# Patient Record
Sex: FEMALE | Race: WHITE | Employment: OTHER | URBAN - METROPOLITAN AREA
[De-identification: names, ages, dates, MRNs, and addresses within clinical notes are randomized per-mention and may not be internally consistent; named-entity substitution may affect disease eponyms.]

---

## 2017-03-23 PROBLEM — I65.23 BILATERAL CAROTID ARTERY STENOSIS: Status: ACTIVE | Noted: 2017-03-23

## 2017-03-23 PROBLEM — R09.89 BRUIT OF RIGHT CAROTID ARTERY: Status: ACTIVE | Noted: 2017-03-23

## 2017-09-14 PROBLEM — Z79.01 CHRONIC ANTICOAGULATION: Chronic | Status: ACTIVE | Noted: 2017-09-14

## 2017-09-14 PROBLEM — Z86.79 HISTORY OF ATRIAL FIBRILLATION: Chronic | Status: ACTIVE | Noted: 2017-09-14

## 2017-09-14 PROBLEM — R33.8 ACUTE URINARY RETENTION: Status: ACTIVE | Noted: 2017-09-14

## 2017-09-14 PROBLEM — I50.32 CHRONIC DIASTOLIC HEART FAILURE (HCC): Chronic | Status: ACTIVE | Noted: 2017-09-14

## 2017-09-14 PROBLEM — R31.0 GROSS HEMATURIA: Chronic | Status: ACTIVE | Noted: 2017-09-14

## 2017-09-20 PROBLEM — M79.89 LEG SWELLING: Status: ACTIVE | Noted: 2017-09-20

## 2018-03-14 RX ORDER — AMIODARONE HYDROCHLORIDE 100 MG/1
100 TABLET ORAL EVERY OTHER DAY
Qty: 45 TABLET | Refills: 3 | Status: SHIPPED
Start: 2018-03-14 | End: 2022-03-10

## 2018-04-11 ENCOUNTER — HOSPITAL ENCOUNTER (OUTPATIENT)
Dept: CARDIOLOGY | Age: 83
Discharge: HOME | End: 2018-04-11
Payer: MEDICARE

## 2018-04-11 ENCOUNTER — OFFICE VISIT (OUTPATIENT)
Dept: VASCULAR SURGERY | Age: 83
End: 2018-04-11
Payer: MEDICARE

## 2018-04-11 ENCOUNTER — OFFICE VISIT (OUTPATIENT)
Dept: CARDIOLOGY CLINIC | Age: 83
End: 2018-04-11
Payer: MEDICARE

## 2018-04-11 VITALS
RESPIRATION RATE: 16 BRPM | DIASTOLIC BLOOD PRESSURE: 72 MMHG | HEIGHT: 60 IN | SYSTOLIC BLOOD PRESSURE: 126 MMHG | BODY MASS INDEX: 34.95 KG/M2 | WEIGHT: 178 LBS | HEART RATE: 63 BPM

## 2018-04-11 DIAGNOSIS — I65.23 BILATERAL CAROTID ARTERY STENOSIS: ICD-10-CM

## 2018-04-11 DIAGNOSIS — R06.09 DYSPNEA ON EXERTION: ICD-10-CM

## 2018-04-11 DIAGNOSIS — I25.10 CORONARY ARTERY DISEASE INVOLVING NATIVE CORONARY ARTERY OF NATIVE HEART WITHOUT ANGINA PECTORIS: Primary | Chronic | ICD-10-CM

## 2018-04-11 DIAGNOSIS — R09.89 BRUIT OF RIGHT CAROTID ARTERY: Primary | ICD-10-CM

## 2018-04-11 PROCEDURE — 93000 ELECTROCARDIOGRAM COMPLETE: CPT | Performed by: INTERNAL MEDICINE

## 2018-04-11 PROCEDURE — 99213 OFFICE O/P EST LOW 20 MIN: CPT | Performed by: SURGERY

## 2018-04-11 PROCEDURE — 1036F TOBACCO NON-USER: CPT | Performed by: SURGERY

## 2018-04-11 PROCEDURE — G8427 DOCREV CUR MEDS BY ELIG CLIN: HCPCS | Performed by: SURGERY

## 2018-04-11 PROCEDURE — G8598 ASA/ANTIPLAT THER USED: HCPCS | Performed by: SURGERY

## 2018-04-11 PROCEDURE — 4040F PNEUMOC VAC/ADMIN/RCVD: CPT | Performed by: SURGERY

## 2018-04-11 PROCEDURE — 93880 EXTRACRANIAL BILAT STUDY: CPT

## 2018-04-11 PROCEDURE — 1123F ACP DISCUSS/DSCN MKR DOCD: CPT | Performed by: SURGERY

## 2018-04-11 PROCEDURE — G8417 CALC BMI ABV UP PARAM F/U: HCPCS | Performed by: SURGERY

## 2018-04-11 PROCEDURE — 1090F PRES/ABSN URINE INCON ASSESS: CPT | Performed by: SURGERY

## 2018-04-11 PROCEDURE — 99214 OFFICE O/P EST MOD 30 MIN: CPT | Performed by: INTERNAL MEDICINE

## 2018-04-11 RX ORDER — APIXABAN 2.5 MG/1
2.5 TABLET, FILM COATED ORAL 2 TIMES DAILY
Refills: 3 | COMMUNITY
Start: 2018-03-14

## 2018-04-12 ENCOUNTER — HOSPITAL ENCOUNTER (OUTPATIENT)
Dept: GENERAL RADIOLOGY | Age: 83
Discharge: HOME | End: 2018-04-14
Payer: MEDICARE

## 2018-04-12 ENCOUNTER — HOSPITAL ENCOUNTER (OUTPATIENT)
Age: 83
Discharge: HOME | End: 2018-04-14
Payer: MEDICARE

## 2018-04-12 DIAGNOSIS — R06.09 DYSPNEA ON EXERTION: ICD-10-CM

## 2018-04-12 PROCEDURE — 71046 X-RAY EXAM CHEST 2 VIEWS: CPT

## 2018-04-13 ENCOUNTER — HOSPITAL ENCOUNTER (OUTPATIENT)
Dept: CARDIOLOGY | Age: 83
Discharge: HOME | End: 2018-04-13
Payer: MEDICARE

## 2018-04-13 VITALS
WEIGHT: 178 LBS | DIASTOLIC BLOOD PRESSURE: 72 MMHG | SYSTOLIC BLOOD PRESSURE: 118 MMHG | HEART RATE: 77 BPM | HEIGHT: 60 IN | BODY MASS INDEX: 34.95 KG/M2

## 2018-04-13 DIAGNOSIS — I25.10 CORONARY ARTERY DISEASE INVOLVING NATIVE CORONARY ARTERY OF NATIVE HEART WITHOUT ANGINA PECTORIS: Chronic | ICD-10-CM

## 2018-04-13 DIAGNOSIS — R07.9 CHEST PAIN, UNSPECIFIED TYPE: ICD-10-CM

## 2018-04-13 DIAGNOSIS — R06.09 DYSPNEA ON EXERTION: ICD-10-CM

## 2018-04-13 PROCEDURE — 6360000002 HC RX W HCPCS: Performed by: INTERNAL MEDICINE

## 2018-04-13 PROCEDURE — A9500 TC99M SESTAMIBI: HCPCS | Performed by: INTERNAL MEDICINE

## 2018-04-13 PROCEDURE — 78452 HT MUSCLE IMAGE SPECT MULT: CPT

## 2018-04-13 PROCEDURE — 93017 CV STRESS TEST TRACING ONLY: CPT

## 2018-04-13 PROCEDURE — 3430000000 HC RX DIAGNOSTIC RADIOPHARMACEUTICAL: Performed by: INTERNAL MEDICINE

## 2018-04-13 PROCEDURE — 2580000003 HC RX 258: Performed by: INTERNAL MEDICINE

## 2018-04-13 RX ORDER — SODIUM CHLORIDE 0.9 % (FLUSH) 0.9 %
10 SYRINGE (ML) INJECTION PRN
Status: DISCONTINUED | OUTPATIENT
Start: 2018-04-13 | End: 2018-04-14 | Stop reason: HOSPADM

## 2018-04-13 RX ADMIN — Medication 10 ML: at 07:47

## 2018-04-13 RX ADMIN — Medication 10 ML: at 07:46

## 2018-04-13 RX ADMIN — Medication 31.9 MILLICURIE: at 07:46

## 2018-04-13 RX ADMIN — Medication 10 ML: at 06:44

## 2018-04-13 RX ADMIN — REGADENOSON 0.4 MG: 0.08 INJECTION, SOLUTION INTRAVENOUS at 07:47

## 2018-04-13 RX ADMIN — Medication 10.6 MILLICURIE: at 06:44

## 2018-08-06 ENCOUNTER — TELEPHONE (OUTPATIENT)
Dept: ADMINISTRATIVE | Age: 83
End: 2018-08-06

## 2018-08-06 NOTE — TELEPHONE ENCOUNTER
Pt calling to cancel her apt today with Dr. Fanny Collier - she does not have a ride. She will callback to r/s when she finds a ride.   She lives in [place], [place].

## 2018-09-25 RX ORDER — METOPROLOL SUCCINATE 25 MG/1
25 TABLET, EXTENDED RELEASE ORAL DAILY
Qty: 90 TABLET | Refills: 3 | Status: SHIPPED | OUTPATIENT
Start: 2018-09-25 | End: 2019-09-17 | Stop reason: SDUPTHER

## 2018-10-11 ENCOUNTER — OFFICE VISIT (OUTPATIENT)
Dept: VASCULAR SURGERY | Age: 83
End: 2018-10-11
Payer: MEDICARE

## 2018-10-11 ENCOUNTER — HOSPITAL ENCOUNTER (OUTPATIENT)
Dept: CARDIOLOGY | Age: 83
Discharge: HOME | End: 2018-10-11
Payer: MEDICARE

## 2018-10-11 DIAGNOSIS — I65.23 BILATERAL CAROTID ARTERY STENOSIS: ICD-10-CM

## 2018-10-11 DIAGNOSIS — R09.89 BRUIT OF RIGHT CAROTID ARTERY: Primary | ICD-10-CM

## 2018-10-11 PROCEDURE — 1123F ACP DISCUSS/DSCN MKR DOCD: CPT | Performed by: SURGERY

## 2018-10-11 PROCEDURE — G8484 FLU IMMUNIZE NO ADMIN: HCPCS | Performed by: SURGERY

## 2018-10-11 PROCEDURE — G8417 CALC BMI ABV UP PARAM F/U: HCPCS | Performed by: SURGERY

## 2018-10-11 PROCEDURE — 1101F PT FALLS ASSESS-DOCD LE1/YR: CPT | Performed by: SURGERY

## 2018-10-11 PROCEDURE — 93880 EXTRACRANIAL BILAT STUDY: CPT

## 2018-10-11 PROCEDURE — G8427 DOCREV CUR MEDS BY ELIG CLIN: HCPCS | Performed by: SURGERY

## 2018-10-11 PROCEDURE — 4040F PNEUMOC VAC/ADMIN/RCVD: CPT | Performed by: SURGERY

## 2018-10-11 PROCEDURE — 1090F PRES/ABSN URINE INCON ASSESS: CPT | Performed by: SURGERY

## 2018-10-11 PROCEDURE — G8598 ASA/ANTIPLAT THER USED: HCPCS | Performed by: SURGERY

## 2018-10-11 PROCEDURE — 1036F TOBACCO NON-USER: CPT | Performed by: SURGERY

## 2018-10-11 PROCEDURE — 99213 OFFICE O/P EST LOW 20 MIN: CPT | Performed by: SURGERY

## 2018-10-11 RX ORDER — GABAPENTIN 100 MG/1
100 CAPSULE ORAL DAILY
Refills: 5 | COMMUNITY
Start: 2018-07-27 | End: 2022-03-10

## 2018-10-11 NOTE — PROGRESS NOTES
Chief Complaint:   Chief Complaint   Patient presents with    Circulatory Problem     Follow up carotid stenosis         HPI:  Patient came to the office for evaluation of carotid artery disease, overall doing well, sometimes gets short of breath, when she is sleeping on the left side, following up with her cardiologist      Patient denies any focal lateralizing neurological symptoms like loss of speech, vision or loss of function of extremity    Patient can walk a few blocks slowly, and denies any symptoms of rest pain    No Known Allergies    Current Outpatient Prescriptions   Medication Sig Dispense Refill    metoprolol succinate (TOPROL XL) 25 MG extended release tablet Take 1 tablet by mouth daily 90 tablet 3    ELIQUIS 2.5 MG TABS tablet 2.5 mg 2 times daily   3    amiodarone (PACERONE) 100 MG tablet Take 1 tablet by mouth every other day 45 tablet 3    levothyroxine (SYNTHROID) 125 MCG tablet 125 mcg daily       rosuvastatin (CRESTOR) 10 MG tablet 10 mg       furosemide (LASIX) 20 MG tablet Take 1 tablet by mouth daily. 30 tablet 3    lisinopril (PRINIVIL;ZESTRIL) 40 MG tablet Take 40 mg by mouth daily.  gabapentin (NEURONTIN) 100 MG capsule Take 100 mg by mouth daily. .  5     No current facility-administered medications for this visit. Past Medical History:   Diagnosis Date    Bilateral carotid artery stenosis 3/23/2017    Bruit of right carotid artery 3/23/2017    CAD (coronary artery disease) 1999    STENTS X2, FOLLOWS WITH DR. Michael Trujillo, Last visit 3/2014, will see in September 2014     Diabetes mellitus ([facility] [address] 1/30/14    BORDERLINE, NO RX CURRENTLY, DIET CONTROLLED Stable checks every other day  running between  6/2014    H/O cardiac catheterization 8/2013    [facility], PATIENT STATES THAT STENTS ARE 50% BLOCKED AND IS CURRENTLY UNDER OBSERVATION WITH DR. Ashley García, states they are stable as of 3/14    Hyperlipidemia     stable 6/2014 per patient     Hypertension     PATIENT STATES BP HAS BEEN STABLE 'S  6/2014    Hypothyroidism     stable 6/2014     Leg swelling 9/20/2017       Past Surgical History:   Procedure Laterality Date    ABDOMEN SURGERY  patient states she was in her 19's     abdominal fissure repair     APPENDECTOMY      16years old    2021 [facility]    CABG X 2, DR. BAER AND [address]    COLONOSCOPY  2014    CORONARY ANGIOPLASTY  1999    DR. LOPEZ., STENTS X 2    DIAGNOSTIC CARDIAC CATH LAB PROCEDURE  2013    SIGNIFICANT LEFT MAIN DISEASE: STATUS POST AORTOCORONARY BYPASS GRAFTING WITH LEFT INTERNAL MAMMARY ARTERY GRAFT TO LEFT ANTERIOR DESCENDING, AS WELL AS SAPHENOUR VEIN GRAFTS TO OBTUSE MARGINAL.  ENDOSCOPY, COLON, DIAGNOSTIC  6/16/2014    HYSTERECTOMY  patient states she was in her 42's     JOINT REPLACEMENT Bilateral     knees -1999    OTHER SURGICAL HISTORY      lysis of adhesions, patient states she was in her 19's    SHOULDER SURGERY Left 2007    rotator cuff    SHOULDER SURGERY Right 2004    replacement     THYROIDECTOMY  11/15/2012    [place] Dr. Luci Lynn        Family History   Problem Relation Age of Onset    Cancer Mother         colon    Heart Disease Sister     Cancer Brother         liver    Heart Disease Brother        Social History     Social History    Marital status:      Spouse name: N/A    Number of children: N/A    Years of education: N/A     Occupational History    Not on file.      Social History Main Topics    Smoking status: Never Smoker    Smokeless tobacco: Never Used    Alcohol use No    Drug use: No    Sexual activity: Not on file     Other Topics Concern    Not on file     Social History Narrative    No narrative on file       Review of Systems:    Eyes:  No blurring, diplopia or vision loss  Respiratory:  No cough, pleuritic chest pain, dyspnea, or wheezing  Cardiovascular: No angina, palpitations   Musculoskeletal:  No arthritis or

## 2018-10-12 NOTE — PROCEDURES
[address]                 [address] [facility],  [facility]                               VASCULAR REPORT    PATIENT NAME: Elza Boss                   :         1931  MED REC NO:   [id number]                            ROOM:  ACCOUNT NO:   [de-identified]                           ADMIT DATE:  10/11/2018  PROVIDER:     Page Swain MD    DATE OF PROCEDURE:  10/11/2018    INDICATION:  Bilateral carotid stenosis. Duplex scanning of the right carotid artery revealed moderate plaque  with a peak internal carotid velocity of 999, diastolic velocity of 60  cm/sec with plaque causing 70% stenosis stenosis. Duplex scanning of the left carotid artery revealed hard plaque with a  peak internal carotid velocity of 830, diastolic velocity of 30 cm/sec  with plaque causing 50% stenosis. IMPRESSION:  70% stenosis of right carotid associated with 50%  stenosis of left carotid artery, unchanged from last study.         Ruy Cancino MD    D: 10/11/2018 11:59:04       T: 10/11/2018 13:56:19     [email or web address]  Job#: [id number]     Doc#: [id number]    CC:

## 2018-10-15 ENCOUNTER — OFFICE VISIT (OUTPATIENT)
Dept: CARDIOLOGY CLINIC | Age: 83
End: 2018-10-15
Payer: MEDICARE

## 2018-10-15 VITALS
BODY MASS INDEX: 35.14 KG/M2 | HEIGHT: 60 IN | WEIGHT: 179 LBS | SYSTOLIC BLOOD PRESSURE: 138 MMHG | HEART RATE: 78 BPM | RESPIRATION RATE: 14 BRPM | DIASTOLIC BLOOD PRESSURE: 76 MMHG

## 2018-10-15 DIAGNOSIS — R06.09 DYSPNEA ON EXERTION: ICD-10-CM

## 2018-10-15 DIAGNOSIS — I25.10 CORONARY ARTERY DISEASE INVOLVING NATIVE CORONARY ARTERY OF NATIVE HEART WITHOUT ANGINA PECTORIS: Primary | Chronic | ICD-10-CM

## 2018-10-15 DIAGNOSIS — I35.0 NONRHEUMATIC AORTIC VALVE STENOSIS: ICD-10-CM

## 2018-10-15 PROCEDURE — 1101F PT FALLS ASSESS-DOCD LE1/YR: CPT | Performed by: INTERNAL MEDICINE

## 2018-10-15 PROCEDURE — 93000 ELECTROCARDIOGRAM COMPLETE: CPT | Performed by: INTERNAL MEDICINE

## 2018-10-15 PROCEDURE — G8417 CALC BMI ABV UP PARAM F/U: HCPCS | Performed by: INTERNAL MEDICINE

## 2018-10-15 PROCEDURE — 1123F ACP DISCUSS/DSCN MKR DOCD: CPT | Performed by: INTERNAL MEDICINE

## 2018-10-15 PROCEDURE — 4040F PNEUMOC VAC/ADMIN/RCVD: CPT | Performed by: INTERNAL MEDICINE

## 2018-10-15 PROCEDURE — G8427 DOCREV CUR MEDS BY ELIG CLIN: HCPCS | Performed by: INTERNAL MEDICINE

## 2018-10-15 PROCEDURE — 99214 OFFICE O/P EST MOD 30 MIN: CPT | Performed by: INTERNAL MEDICINE

## 2018-10-15 PROCEDURE — G8484 FLU IMMUNIZE NO ADMIN: HCPCS | Performed by: INTERNAL MEDICINE

## 2018-10-15 PROCEDURE — 1036F TOBACCO NON-USER: CPT | Performed by: INTERNAL MEDICINE

## 2018-10-15 PROCEDURE — 1090F PRES/ABSN URINE INCON ASSESS: CPT | Performed by: INTERNAL MEDICINE

## 2018-10-15 PROCEDURE — G8598 ASA/ANTIPLAT THER USED: HCPCS | Performed by: INTERNAL MEDICINE

## 2018-10-15 RX ORDER — FUROSEMIDE 40 MG/1
40 TABLET ORAL DAILY
Qty: 30 TABLET | Refills: 3 | Status: SHIPPED | OUTPATIENT
Start: 2018-10-15 | End: 2019-01-11 | Stop reason: SDUPTHER

## 2018-10-15 NOTE — PATIENT INSTRUCTIONS
1. Increase Lasix to 40 mg po daily. 2. Get BMP in one week  3. Schedule for an echo to evaluate aortic stenosis and exertional dyspnea. 4. Continue rest of current medications  5. Follow up with me in one month.

## 2018-10-15 NOTE — PROGRESS NOTES
 CAD (coronary artery disease) 1999    STENTS X2, FOLLOWS WITH DR. Rajeev Marti, Last visit 3/2014, will see in September 2014     Diabetes mellitus ([address] 1/30/14    BORDERLINE, NO RX CURRENTLY, DIET CONTROLLED Stable checks every other day  running between  6/2014    H/O cardiac catheterization 8/2013    [facility], PATIENT STATES THAT STENTS ARE 50% BLOCKED AND IS CURRENTLY UNDER OBSERVATION WITH DR. Peri Parish, states they are stable as of 3/14    Hyperlipidemia     stable 6/2014 per patient     Hypertension     PATIENT STATES BP HAS BEEN STABLE 'S  6/2014    Hypothyroidism     stable 6/2014     Leg swelling 9/20/2017       Past Surgical History:  Past Surgical History:   Procedure Laterality Date    ABDOMEN SURGERY  patient states she was in her 19's     abdominal fissure repair     APPENDECTOMY      16years old    2021 [facility]    CABG X 2, DR. BAER AND [address]    COLONOSCOPY  2014    CORONARY ANGIOPLASTY  1999    DR. LOPEZ., STENTS X 2    DIAGNOSTIC CARDIAC CATH LAB PROCEDURE  2013    SIGNIFICANT LEFT MAIN DISEASE: STATUS POST AORTOCORONARY BYPASS GRAFTING WITH LEFT INTERNAL MAMMARY ARTERY GRAFT TO LEFT ANTERIOR DESCENDING, AS WELL AS SAPHENOUR VEIN GRAFTS TO OBTUSE MARGINAL.  ENDOSCOPY, COLON, DIAGNOSTIC  6/16/2014    HYSTERECTOMY  patient states she was in her 42's     JOINT REPLACEMENT Bilateral     knees -1999    OTHER SURGICAL HISTORY      lysis of adhesions, patient states she was in her 19's    SHOULDER SURGERY Left 2007    rotator cuff    SHOULDER SURGERY Right 2004    replacement     THYROIDECTOMY  11/15/2012    [place] Dr. Gui Gutiérrez        Family History:  Family History   Problem Relation Age of Onset    Cancer Mother         colon    Heart Disease Sister     Cancer Brother         liver    Heart Disease Brother        Social History:  Social History     Social History    Marital status:       Spouse name: N/A extremity edema  Neurologic: No focal motor deficits apparent, normal mood and affect, alert and oriented x 3  Peripheral Pulses: Intact posterior tibial pulses bilaterally    Laboratory Tests:  Lab Results   Component Value Date    CREATININE 0.9 01/09/2018    BUN 24 (H) 01/09/2018     01/09/2018    K 4.4 01/09/2018     01/09/2018    CO2 28 01/09/2018     No results found for: MG  Lab Results   Component Value Date    WBC 7.7 01/09/2018    HGB 13.3 01/09/2018    HCT 41.3 01/09/2018    MCV 90.6 01/09/2018     01/09/2018     Lab Results   Component Value Date    ALT 11 01/09/2018    AST 16 01/09/2018    ALKPHOS 66 01/09/2018    BILITOT 0.8 01/09/2018     Lab Results   Component Value Date    CKTOTAL 47 08/20/2014    CKMB 2.2 08/20/2014    TROPONINI <0.01 08/20/2014    TROPONINI <0.01 08/19/2014    TROPONINI <0.01 08/19/2014     Lab Results   Component Value Date    INR 1.5 09/13/2017    INR 1.0 08/19/2014    PROTIME 16.8 (H) 09/13/2017    PROTIME 10.6 08/19/2014     Lab Results   Component Value Date    TSH 1.090 01/09/2018     Lab Results   Component Value Date    LABA1C 6.6 (H) 08/16/2017     No results found for: EAG  Lab Results   Component Value Date    CHOL 183 08/16/2017    CHOL 180 05/03/2017    CHOL 159 02/15/2017     Lab Results   Component Value Date    TRIG 86 08/16/2017    TRIG 65 05/03/2017    TRIG 104 02/15/2017     Lab Results   Component Value Date    HDL 72 08/16/2017    HDL 95 05/03/2017    HDL 71 02/15/2017     Lab Results   Component Value Date    LDLCALC 94 08/16/2017    LDLCALC 72 05/03/2017    LDLCALC 67 02/15/2017     Lab Results   Component Value Date    LABVLDL 17 08/16/2017    LABVLDL 13 05/03/2017    LABVLDL 21 02/15/2017     No results found for: CHOLHDLRATIO    Cardiac Tests:  ECG: Normal sinus rhythm, with occasional premature atrial complexes and underwent her complexes, low voltage precordial leads, abnormal ECG.     Echocardiogram: 7/12/2017-LVEF 65%, moderate MR,

## 2018-10-15 NOTE — PROGRESS NOTES
calculate for the following reasons: The 2013 ASCVD risk score is only valid for ages 36 to 78        ASSESSMENT:  1. Exertional dyspnea and positional dyspnea - last chest x-ray showed no pulmonary etiology. 2. Coronary artery disease status post CABG-1987, status post stent to RCA in 2000  3. Paroxysmal atrial fibrillation in sinus rhythm on amiodarone and Eliquis  4. History of recent hematuria resolved  5. Hypertension well controlled  6.  hyperlipidemia on statin  7. Aortic stenosis, moderate MR,  moderate to severeTR    Plan:   1. Increase Lasix to 40 mg po daily. 2. Get BMP in one week  3. Schedule for an echo to evaluate aortic stenosis and exertional dyspnea. 4. Continue rest of current medications  5. Follow up with me in one month. The patient's current medication list, allergies, problem list and results of all previously ordered testing were reviewed at today's visit.   Fadi Domingo MD  [facility]) Cardiology

## 2018-10-22 DIAGNOSIS — R06.09 DYSPNEA ON EXERTION: ICD-10-CM

## 2018-10-22 DIAGNOSIS — I35.0 NONRHEUMATIC AORTIC VALVE STENOSIS: ICD-10-CM

## 2018-10-23 ENCOUNTER — TELEPHONE (OUTPATIENT)
Dept: CARDIOLOGY CLINIC | Age: 83
End: 2018-10-23

## 2018-10-25 ENCOUNTER — HOSPITAL ENCOUNTER (OUTPATIENT)
Dept: CARDIOLOGY | Age: 83
Discharge: HOME | End: 2018-10-25
Payer: MEDICARE

## 2018-10-25 DIAGNOSIS — I35.0 NONRHEUMATIC AORTIC VALVE STENOSIS: Primary | ICD-10-CM

## 2018-10-25 LAB
LV EF: 65 %
LVEF MODALITY: NORMAL

## 2018-10-25 PROCEDURE — 93306 TTE W/DOPPLER COMPLETE: CPT | Performed by: PSYCHIATRY & NEUROLOGY

## 2018-10-26 ENCOUNTER — TELEPHONE (OUTPATIENT)
Dept: CARDIOLOGY CLINIC | Age: 83
End: 2018-10-26

## 2018-10-26 NOTE — TELEPHONE ENCOUNTER
Patient notified of echo results and Dr. Frias's recommendation. Patient will keep F/U scheduled for  11/14/18.

## 2018-11-09 ENCOUNTER — TELEPHONE (OUTPATIENT)
Dept: ADMINISTRATIVE | Age: 83
End: 2018-11-09

## 2018-11-15 ENCOUNTER — HOSPITAL ENCOUNTER (OUTPATIENT)
Age: 83
Discharge: HOME | End: 2018-11-17
Payer: MEDICARE

## 2018-11-15 PROCEDURE — 88112 CYTOPATH CELL ENHANCE TECH: CPT

## 2019-01-14 RX ORDER — FUROSEMIDE 40 MG/1
40 TABLET ORAL DAILY
Qty: 30 TABLET | Refills: 11 | Status: SHIPPED | OUTPATIENT
Start: 2019-01-14 | End: 2019-12-09 | Stop reason: SDUPTHER

## 2019-01-16 ENCOUNTER — TELEPHONE (OUTPATIENT)
Dept: CARDIOLOGY CLINIC | Age: 84
End: 2019-01-16

## 2019-02-13 ENCOUNTER — OFFICE VISIT (OUTPATIENT)
Dept: CARDIOLOGY CLINIC | Age: 84
End: 2019-02-13
Payer: MEDICARE

## 2019-02-13 VITALS
HEART RATE: 72 BPM | SYSTOLIC BLOOD PRESSURE: 156 MMHG | WEIGHT: 161 LBS | BODY MASS INDEX: 31.61 KG/M2 | DIASTOLIC BLOOD PRESSURE: 72 MMHG | HEIGHT: 60 IN | RESPIRATION RATE: 18 BRPM

## 2019-02-13 DIAGNOSIS — R06.09 EXERTIONAL DYSPNEA: ICD-10-CM

## 2019-02-13 DIAGNOSIS — I25.10 CORONARY ARTERY DISEASE INVOLVING NATIVE CORONARY ARTERY OF NATIVE HEART WITHOUT ANGINA PECTORIS: Primary | Chronic | ICD-10-CM

## 2019-02-13 DIAGNOSIS — Z51.81 ENCOUNTER FOR MONITORING AMIODARONE THERAPY: ICD-10-CM

## 2019-02-13 DIAGNOSIS — I35.1 NONRHEUMATIC AORTIC VALVE INSUFFICIENCY: ICD-10-CM

## 2019-02-13 DIAGNOSIS — Z79.899 ENCOUNTER FOR MONITORING AMIODARONE THERAPY: ICD-10-CM

## 2019-02-13 PROCEDURE — 1090F PRES/ABSN URINE INCON ASSESS: CPT | Performed by: INTERNAL MEDICINE

## 2019-02-13 PROCEDURE — G8598 ASA/ANTIPLAT THER USED: HCPCS | Performed by: INTERNAL MEDICINE

## 2019-02-13 PROCEDURE — G8484 FLU IMMUNIZE NO ADMIN: HCPCS | Performed by: INTERNAL MEDICINE

## 2019-02-13 PROCEDURE — G8417 CALC BMI ABV UP PARAM F/U: HCPCS | Performed by: INTERNAL MEDICINE

## 2019-02-13 PROCEDURE — G8427 DOCREV CUR MEDS BY ELIG CLIN: HCPCS | Performed by: INTERNAL MEDICINE

## 2019-02-13 PROCEDURE — 99214 OFFICE O/P EST MOD 30 MIN: CPT | Performed by: INTERNAL MEDICINE

## 2019-02-13 PROCEDURE — 1036F TOBACCO NON-USER: CPT | Performed by: INTERNAL MEDICINE

## 2019-02-13 PROCEDURE — 4040F PNEUMOC VAC/ADMIN/RCVD: CPT | Performed by: INTERNAL MEDICINE

## 2019-02-13 PROCEDURE — 1123F ACP DISCUSS/DSCN MKR DOCD: CPT | Performed by: INTERNAL MEDICINE

## 2019-02-13 PROCEDURE — 1101F PT FALLS ASSESS-DOCD LE1/YR: CPT | Performed by: INTERNAL MEDICINE

## 2019-02-13 PROCEDURE — 93000 ELECTROCARDIOGRAM COMPLETE: CPT | Performed by: INTERNAL MEDICINE

## 2019-02-13 RX ORDER — ASPIRIN 81 MG/1
81 TABLET ORAL DAILY
Qty: 90 TABLET | Refills: 1 | Status: SHIPPED | OUTPATIENT
Start: 2019-02-13 | End: 2019-06-27 | Stop reason: SDUPTHER

## 2019-02-14 ENCOUNTER — TELEPHONE (OUTPATIENT)
Dept: CARDIOLOGY CLINIC | Age: 84
End: 2019-02-14

## 2019-02-14 DIAGNOSIS — R06.09 DOE (DYSPNEA ON EXERTION): Primary | ICD-10-CM

## 2019-02-28 ENCOUNTER — HOSPITAL ENCOUNTER (OUTPATIENT)
Dept: GENERAL RADIOLOGY | Age: 84
Discharge: HOME | End: 2019-03-02
Payer: MEDICARE

## 2019-02-28 ENCOUNTER — HOSPITAL ENCOUNTER (OUTPATIENT)
Age: 84
Discharge: HOME | End: 2019-03-02
Payer: MEDICARE

## 2019-02-28 ENCOUNTER — HOSPITAL ENCOUNTER (OUTPATIENT)
Dept: PULMONOLOGY | Age: 84
Discharge: HOME | End: 2019-02-28
Payer: MEDICARE

## 2019-02-28 DIAGNOSIS — Z79.899 ENCOUNTER FOR MONITORING AMIODARONE THERAPY: ICD-10-CM

## 2019-02-28 DIAGNOSIS — R06.09 DOE (DYSPNEA ON EXERTION): ICD-10-CM

## 2019-02-28 DIAGNOSIS — R06.09 EXERTIONAL DYSPNEA: ICD-10-CM

## 2019-02-28 DIAGNOSIS — Z51.81 ENCOUNTER FOR MONITORING AMIODARONE THERAPY: ICD-10-CM

## 2019-02-28 PROCEDURE — 94060 EVALUATION OF WHEEZING: CPT

## 2019-02-28 PROCEDURE — 94729 DIFFUSING CAPACITY: CPT

## 2019-02-28 PROCEDURE — 71046 X-RAY EXAM CHEST 2 VIEWS: CPT

## 2019-02-28 PROCEDURE — 94726 PLETHYSMOGRAPHY LUNG VOLUMES: CPT

## 2019-03-04 ENCOUNTER — TELEPHONE (OUTPATIENT)
Dept: CARDIOLOGY CLINIC | Age: 84
End: 2019-03-04

## 2019-03-26 ENCOUNTER — TELEPHONE (OUTPATIENT)
Dept: CARDIOLOGY CLINIC | Age: 84
End: 2019-03-26

## 2019-03-28 DIAGNOSIS — I35.1 NONRHEUMATIC AORTIC VALVE INSUFFICIENCY: ICD-10-CM

## 2019-03-28 DIAGNOSIS — R06.09 EXERTIONAL DYSPNEA: ICD-10-CM

## 2019-03-29 ENCOUNTER — TELEPHONE (OUTPATIENT)
Dept: CARDIOLOGY CLINIC | Age: 84
End: 2019-03-29

## 2019-04-11 DIAGNOSIS — I65.23 BILATERAL CAROTID ARTERY STENOSIS: Primary | ICD-10-CM

## 2019-04-18 ENCOUNTER — OFFICE VISIT (OUTPATIENT)
Dept: VASCULAR SURGERY | Age: 84
End: 2019-04-18
Payer: MEDICARE

## 2019-04-18 ENCOUNTER — OFFICE VISIT (OUTPATIENT)
Dept: CARDIOLOGY CLINIC | Age: 84
End: 2019-04-18
Payer: MEDICARE

## 2019-04-18 ENCOUNTER — HOSPITAL ENCOUNTER (OUTPATIENT)
Dept: CARDIOLOGY | Age: 84
Discharge: HOME | End: 2019-04-18
Payer: MEDICARE

## 2019-04-18 VITALS
HEIGHT: 60 IN | DIASTOLIC BLOOD PRESSURE: 60 MMHG | RESPIRATION RATE: 16 BRPM | WEIGHT: 163 LBS | SYSTOLIC BLOOD PRESSURE: 128 MMHG | BODY MASS INDEX: 32 KG/M2 | HEART RATE: 74 BPM

## 2019-04-18 DIAGNOSIS — R09.89 BRUIT OF RIGHT CAROTID ARTERY: Primary | ICD-10-CM

## 2019-04-18 DIAGNOSIS — I65.23 BILATERAL CAROTID ARTERY STENOSIS: ICD-10-CM

## 2019-04-18 DIAGNOSIS — R06.09 DOE (DYSPNEA ON EXERTION): Primary | ICD-10-CM

## 2019-04-18 PROCEDURE — 4040F PNEUMOC VAC/ADMIN/RCVD: CPT | Performed by: SURGERY

## 2019-04-18 PROCEDURE — G8598 ASA/ANTIPLAT THER USED: HCPCS | Performed by: INTERNAL MEDICINE

## 2019-04-18 PROCEDURE — 1123F ACP DISCUSS/DSCN MKR DOCD: CPT | Performed by: SURGERY

## 2019-04-18 PROCEDURE — 4040F PNEUMOC VAC/ADMIN/RCVD: CPT | Performed by: INTERNAL MEDICINE

## 2019-04-18 PROCEDURE — G8417 CALC BMI ABV UP PARAM F/U: HCPCS | Performed by: SURGERY

## 2019-04-18 PROCEDURE — 99213 OFFICE O/P EST LOW 20 MIN: CPT | Performed by: SURGERY

## 2019-04-18 PROCEDURE — 1090F PRES/ABSN URINE INCON ASSESS: CPT | Performed by: INTERNAL MEDICINE

## 2019-04-18 PROCEDURE — G8427 DOCREV CUR MEDS BY ELIG CLIN: HCPCS | Performed by: INTERNAL MEDICINE

## 2019-04-18 PROCEDURE — 1090F PRES/ABSN URINE INCON ASSESS: CPT | Performed by: SURGERY

## 2019-04-18 PROCEDURE — 1123F ACP DISCUSS/DSCN MKR DOCD: CPT | Performed by: INTERNAL MEDICINE

## 2019-04-18 PROCEDURE — 99214 OFFICE O/P EST MOD 30 MIN: CPT | Performed by: INTERNAL MEDICINE

## 2019-04-18 PROCEDURE — G8417 CALC BMI ABV UP PARAM F/U: HCPCS | Performed by: INTERNAL MEDICINE

## 2019-04-18 PROCEDURE — 1036F TOBACCO NON-USER: CPT | Performed by: SURGERY

## 2019-04-18 PROCEDURE — 93000 ELECTROCARDIOGRAM COMPLETE: CPT | Performed by: INTERNAL MEDICINE

## 2019-04-18 PROCEDURE — G8427 DOCREV CUR MEDS BY ELIG CLIN: HCPCS | Performed by: SURGERY

## 2019-04-18 PROCEDURE — G8598 ASA/ANTIPLAT THER USED: HCPCS | Performed by: SURGERY

## 2019-04-18 PROCEDURE — 93880 EXTRACRANIAL BILAT STUDY: CPT

## 2019-04-18 PROCEDURE — 1036F TOBACCO NON-USER: CPT | Performed by: INTERNAL MEDICINE

## 2019-04-18 NOTE — PROGRESS NOTES
OUTPATIENT CARDIOLOGY FOLLOW-UP    Name: Ann Hood    Age: 80 y.o. Primary Care Physician: John Mims MD    Date of Service: 4/18/2019    Chief Complaint:   Chief Complaint   Patient presents with    Coronary Artery Disease       Interim History:   Mrs. Deya Ritter is a 51-year-old female history of coronary artery disease diagnosed in 1987 underwent coronary artery bypass surgery in 1987, had a cardiac cath in 1996 which showed total occlusion of the left main and 70% stenosis of the RCA with patent grafts. Cardiac cath in 1999 showed a 95% stenosis RCA and underwent PTCA and stent to proximal and distal RCA and repeat In 2000 showed patent grafts and stents. She also has history of hypertension hyperlipidemia, history of for atrial fibrillation on amiodarone and Eliquis and is here for follow-up visit. She was last seen in the office on 2/13/2019. Since her last visit, she has not had any ER visits. She has dyspnea on laying down and able to sleep only couple of hours at a time. Has swelling on her legs. She lost 8 lbs since her last visit. She has been experiencing pinching pain over the left side of the chest pain with radiation to to left scapula and lasts only seconds. This pain she noticed mostly while sleeping and not with exertion. She is also experiencing palpitations on walking. She denies recent lightheadedness, dizziness, syncope. She denies any exertional dyspnea but she does gets winded if she walks fast.  She never smoked. She she had a cardiac MRI done last month to evaluate the severity of the aortic regurgitation and the results showed moderate aortic regurgitation with regurgitant fraction of 30%. She is still complaining of dyspnea with exertion without any chest pain. Her transthoracic echocardiogram showed diastolic dysfunction stage II. She was questioning about the anticoagulation therapy with Eliquis. Currently she is taking only 2.5 mg twice daily.  Ideally, she MAMMARY ARTERY GRAFT TO LEFT ANTERIOR DESCENDING, AS WELL AS SAPHENOUR VEIN GRAFTS TO OBTUSE MARGINAL.  ENDOSCOPY, COLON, DIAGNOSTIC  6/16/2014    HYSTERECTOMY  patient states she was in her 42's     JOINT REPLACEMENT Bilateral     knees -1999    OTHER SURGICAL HISTORY      lysis of adhesions, patient states she was in her 19's    SHOULDER SURGERY Left 2007    rotator cuff    SHOULDER SURGERY Right 2004    replacement     THYROIDECTOMY  11/15/2012    [place] [facility]. Tolbert Factor        Family History:  Family History   Problem Relation Age of Onset    Cancer Mother         colon    Heart Disease Sister     Cancer Brother         liver    Heart Disease Brother        Social History:  Social History     Socioeconomic History    Marital status:       Spouse name: Not on file    Number of children: Not on file    Years of education: Not on file    Highest education level: Not on file   Occupational History    Not on file   Social Needs    Financial resource strain: Not on file    Food insecurity:     Worry: Not on file     Inability: Not on file    Transportation needs:     Medical: Not on file     Non-medical: Not on file   Tobacco Use    Smoking status: Never Smoker    Smokeless tobacco: Never Used   Substance and Sexual Activity    Alcohol use: No     Alcohol/week: 0.0 oz    Drug use: No    Sexual activity: Not on file   Lifestyle    Physical activity:     Days per week: Not on file     Minutes per session: Not on file    Stress: Not on file   Relationships    Social connections:     Talks on phone: Not on file     Gets together: Not on file     Attends Shinto service: Not on file     Active member of club or organization: Not on file     Attends meetings of clubs or organizations: Not on file     Relationship status: Not on file    Intimate partner violence:     Fear of current or ex partner: Not on file     Emotionally abused: Not on file     Physically abused: Not on file     Forced deficits apparent, normal mood and affect, alert and oriented x 3  Peripheral Pulses: Intact posterior tibial pulses bilaterally    Laboratory Tests:  Lab Results   Component Value Date    CREATININE 0.9 01/09/2018    BUN 24 (H) 01/09/2018     01/09/2018    K 4.4 01/09/2018     01/09/2018    CO2 28 01/09/2018     No results found for: MG  Lab Results   Component Value Date    WBC 7.7 01/09/2018    HGB 13.3 01/09/2018    HCT 41.3 01/09/2018    MCV 90.6 01/09/2018     01/09/2018     Lab Results   Component Value Date    ALT 11 01/09/2018    AST 16 01/09/2018    ALKPHOS 66 01/09/2018    BILITOT 0.8 01/09/2018     Lab Results   Component Value Date    CKTOTAL 47 08/20/2014    CKMB 2.2 08/20/2014    TROPONINI <0.01 08/20/2014    TROPONINI <0.01 08/19/2014    TROPONINI <0.01 08/19/2014     Lab Results   Component Value Date    INR 1.5 09/13/2017    INR 1.0 08/19/2014    PROTIME 16.8 (H) 09/13/2017    PROTIME 10.6 08/19/2014     Lab Results   Component Value Date    TSH 1.090 01/09/2018     Lab Results   Component Value Date    LABA1C 6.6 (H) 08/16/2017     No results found for: EAG  Lab Results   Component Value Date    CHOL 183 08/16/2017    CHOL 180 05/03/2017    CHOL 159 02/15/2017     Lab Results   Component Value Date    TRIG 86 08/16/2017    TRIG 65 05/03/2017    TRIG 104 02/15/2017     Lab Results   Component Value Date    HDL 72 08/16/2017    HDL 95 05/03/2017    HDL 71 02/15/2017     Lab Results   Component Value Date    LDLCALC 94 08/16/2017    LDLCALC 72 05/03/2017    LDLCALC 67 02/15/2017     Lab Results   Component Value Date    LABVLDL 17 08/16/2017    LABVLDL 13 05/03/2017    LABVLDL 21 02/15/2017     No results found for: CHOLHDLRATIO    Cardiac Tests:  ECG: Normal sinus rhythm, 1st degree AV block other wise normal EKG. Echocardiogram: 7/12/2017-LVEF 65%, moderate MR, mild to moderate ileus, mild AR, moderate to severe TR, normal PASp.     TTE- 10/25/2018  Normal left ventricle size aspirin 81 mg po daily  2. Continue Eliquis 2.5 mg po BID (low dose due to hematuria)  3. Continue rest of current medications  4. There are no contraindications for flying. 5. Follow up with me in 6 months. The patient's current medication list, allergies, problem list and results of all previously ordered testing were reviewed at today's visit.     Lee Meneses MD  [facility]) Cardiology

## 2019-04-18 NOTE — PROGRESS NOTES
Chief Complaint:   Chief Complaint   Patient presents with    Circulatory Problem     Follow up carotid stenosis         HPI:  A she came to the office with her family, for evaluation of carotid artery disease, doing well      Patient denies any focal lateralizing neurological symptoms like loss of speech, vision or loss of function of extremity    Patient can walk a few blocks without difficulty, and denies any symptoms of rest pain    No Known Allergies    Current Outpatient Medications   Medication Sig Dispense Refill    aspirin EC 81 MG EC tablet Take 1 tablet by mouth daily 90 tablet 1    furosemide (LASIX) 40 MG tablet TAKE 1 TABLET BY MOUTH DAILY 30 tablet 11    metFORMIN (GLUCOPHAGE) 500 MG tablet 500 mg daily (with breakfast)   5    gabapentin (NEURONTIN) 100 MG capsule Take 100 mg by mouth daily. .  5    metoprolol succinate (TOPROL XL) 25 MG extended release tablet Take 1 tablet by mouth daily 90 tablet 3    ELIQUIS 2.5 MG TABS tablet 2.5 mg 2 times daily   3    amiodarone (PACERONE) 100 MG tablet Take 1 tablet by mouth every other day (Patient taking differently: Take 100 mg by mouth daily Indications: pt. is taking 1/2tablet daily ) 45 tablet 3    levothyroxine (SYNTHROID) 125 MCG tablet 112 mcg Daily       rosuvastatin (CRESTOR) 10 MG tablet 10 mg daily       lisinopril (PRINIVIL;ZESTRIL) 40 MG tablet Take 40 mg by mouth daily. No current facility-administered medications for this visit. Past Medical History:   Diagnosis Date    Bilateral carotid artery stenosis 3/23/2017    Bruit of right carotid artery 3/23/2017    CAD (coronary artery disease) 1999    STENTS X2, FOLLOWS WITH DR. Merrick Ward, Last visit 3/2014, will see in September 2014     Diabetes mellitus ([facility] [address] 1/30/14    BORDERLINE, NO RX CURRENTLY, DIET CONTROLLED Stable checks every other day  running between  6/2014    H/O cardiac catheterization 8/2013    [facility], PATIENT STATES THAT STENTS ARE 50% BLOCKED AND IS CURRENTLY UNDER OBSERVATION WITH DR. Elizabeth Fernandes, states they are stable as of 3/14    Hyperlipidemia     stable 6/2014 per patient     Hypertension     PATIENT STATES BP HAS BEEN STABLE 'S  6/2014    Hypothyroidism     stable 6/2014     Leg swelling 9/20/2017       Past Surgical History:   Procedure Laterality Date    ABDOMEN SURGERY  patient states she was in her 19's     abdominal fissure repair     APPENDECTOMY      16years old    2021 [facility].    CABG X 2, DR. BAER AND [address]    COLONOSCOPY  2014    CORONARY ANGIOPLASTY  1999    DR. LOPEZ., STENTS X 2    DIAGNOSTIC CARDIAC CATH LAB PROCEDURE  2013    SIGNIFICANT LEFT MAIN DISEASE: STATUS POST AORTOCORONARY BYPASS GRAFTING WITH LEFT INTERNAL MAMMARY ARTERY GRAFT TO LEFT ANTERIOR DESCENDING, AS WELL AS SAPHENOUR VEIN GRAFTS TO OBTUSE MARGINAL.  ENDOSCOPY, COLON, DIAGNOSTIC  6/16/2014    HYSTERECTOMY  patient states she was in her 42's     JOINT REPLACEMENT Bilateral     knees -1999    OTHER SURGICAL HISTORY      lysis of adhesions, patient states she was in her 19's    SHOULDER SURGERY Left 2007    rotator cuff    SHOULDER SURGERY Right 2004    replacement     THYROIDECTOMY  11/15/2012    [place] Dr. Rachel Rosenthal        Family History   Problem Relation Age of Onset    Cancer Mother         colon    Heart Disease Sister     Cancer Brother         liver    Heart Disease Brother        Social History     Socioeconomic History    Marital status:       Spouse name: Not on file    Number of children: Not on file    Years of education: Not on file    Highest education level: Not on file   Occupational History    Not on file   Social Needs    Financial resource strain: Not on file    Food insecurity:     Worry: Not on file     Inability: Not on file    Transportation needs:     Medical: Not on file     Non-medical: Not on file   Tobacco Use    Smoking status: Never Smoker  Smokeless tobacco: Never Used   Substance and Sexual Activity    Alcohol use: No     Alcohol/week: 0.0 oz    Drug use: No    Sexual activity: Not on file   Lifestyle    Physical activity:     Days per week: Not on file     Minutes per session: Not on file    Stress: Not on file   Relationships    Social connections:     Talks on phone: Not on file     Gets together: Not on file     Attends Hindu service: Not on file     Active member of club or organization: Not on file     Attends meetings of clubs or organizations: Not on file     Relationship status: Not on file    Intimate partner violence:     Fear of current or ex partner: Not on file     Emotionally abused: Not on file     Physically abused: Not on file     Forced sexual activity: Not on file   Other Topics Concern    Not on file   Social History Narrative    Not on file       Review of Systems:    Eyes:  No blurring, diplopia or vision loss  Respiratory:  No cough, pleuritic chest pain, dyspnea, or wheezing  Cardiovascular: No angina, palpitations   Musculoskeletal:  No arthritis or weakness  Neurologic:  No paralysis, paresis, paresthesia, seizures or headach        Physical Exam:  General appearance:  Alert, awake, oriented x 3. No distress. Eyes:  Conjunctivae appear normal; PERRL  Neck:  No jugular venous distention, lymphadenopathy or thyromegaly. Carotid bruit noted  Lungs:  Clear to ausculation bilaterally. No rhonchi, crackles, wheezes  Heart:  Regular rate and rhythm. No rub or murmur  Abdomen:  Soft, non-tender. No masses, organomegaly. Musculoskeletal : No joint effusions, tenderness swelling    Neuro: Speech is intact. Moving all extremities. No focal motor or sensory deficits      Extremities:  Both feet are warm to touch.  The color of both feet is normal.        Pulses Right  Left    Brachial 3 3    Radial    3=normal   Femoral 2 2  2=diminished   Popliteal    1=barely palpable   Dorsalis pedis    0=absent   Posterior

## 2019-04-19 ENCOUNTER — APPOINTMENT (OUTPATIENT)
Dept: ULTRASOUND IMAGING | Age: 84
End: 2019-04-19
Payer: MEDICARE

## 2019-04-19 ENCOUNTER — APPOINTMENT (OUTPATIENT)
Dept: GENERAL RADIOLOGY | Age: 84
End: 2019-04-19
Payer: MEDICARE

## 2019-04-19 ENCOUNTER — HOSPITAL ENCOUNTER (EMERGENCY)
Age: 84
Discharge: HOME | End: 2019-04-19
Attending: EMERGENCY MEDICINE
Payer: MEDICARE

## 2019-04-19 ENCOUNTER — HOSPITAL ENCOUNTER (OUTPATIENT)
Dept: MRI IMAGING | Age: 84
Discharge: HOME | End: 2019-04-21
Payer: MEDICARE

## 2019-04-19 ENCOUNTER — APPOINTMENT (OUTPATIENT)
Dept: CT IMAGING | Age: 84
End: 2019-04-19
Payer: MEDICARE

## 2019-04-19 VITALS
RESPIRATION RATE: 16 BRPM | WEIGHT: 161 LBS | OXYGEN SATURATION: 99 % | DIASTOLIC BLOOD PRESSURE: 79 MMHG | HEIGHT: 60 IN | TEMPERATURE: 97.5 F | SYSTOLIC BLOOD PRESSURE: 149 MMHG | HEART RATE: 68 BPM | BODY MASS INDEX: 31.61 KG/M2

## 2019-04-19 DIAGNOSIS — R42 DIZZINESS: Primary | ICD-10-CM

## 2019-04-19 DIAGNOSIS — R42 DIZZINESS: ICD-10-CM

## 2019-04-19 LAB
ANION GAP SERPL CALCULATED.3IONS-SCNC: 11 MMOL/L (ref 7–16)
APTT: 32.9 SEC (ref 24.5–35.1)
BUN BLDV-MCNC: 17 MG/DL (ref 8–23)
CALCIUM SERPL-MCNC: 9 MG/DL (ref 8.6–10.2)
CHLORIDE BLD-SCNC: 100 MMOL/L (ref 98–107)
CHP ED QC CHECK: YES
CO2: 30 MMOL/L (ref 22–29)
CREAT SERPL-MCNC: 0.9 MG/DL (ref 0.5–1)
GFR AFRICAN AMERICAN: >60
GFR NON-AFRICAN AMERICAN: 59 ML/MIN/1.73
GLUCOSE BLD-MCNC: 114 MG/DL
GLUCOSE BLD-MCNC: 132 MG/DL (ref 74–99)
HCT VFR BLD CALC: 34.3 % (ref 34–48)
HEMOGLOBIN: 11.5 G/DL (ref 11.5–15.5)
INR BLD: 1.4
MAGNESIUM: 2 MG/DL (ref 1.6–2.6)
MCH RBC QN AUTO: 30.4 PG (ref 26–35)
MCHC RBC AUTO-ENTMCNC: 33.5 % (ref 32–34.5)
MCV RBC AUTO: 90.7 FL (ref 80–99.9)
METER GLUCOSE: 114 MG/DL (ref 74–99)
PDW BLD-RTO: 13.4 FL (ref 11.5–15)
PLATELET # BLD: 211 E9/L (ref 130–450)
PMV BLD AUTO: 9.9 FL (ref 7–12)
POTASSIUM SERPL-SCNC: 3.3 MMOL/L (ref 3.5–5)
PRO-BNP: 879 PG/ML (ref 0–450)
PROTHROMBIN TIME: 15.2 SEC (ref 9.3–12.4)
RBC # BLD: 3.78 E12/L (ref 3.5–5.5)
SODIUM BLD-SCNC: 141 MMOL/L (ref 132–146)
TROPONIN: <0.01 NG/ML (ref 0–0.03)
WBC # BLD: 7.4 E9/L (ref 4.5–11.5)

## 2019-04-19 PROCEDURE — 85730 THROMBOPLASTIN TIME PARTIAL: CPT

## 2019-04-19 PROCEDURE — 6370000000 HC RX 637 (ALT 250 FOR IP): Performed by: EMERGENCY MEDICINE

## 2019-04-19 PROCEDURE — 96374 THER/PROPH/DIAG INJ IV PUSH: CPT

## 2019-04-19 PROCEDURE — 6360000004 HC RX CONTRAST MEDICATION: Performed by: PHYSICIAN ASSISTANT

## 2019-04-19 PROCEDURE — 84484 ASSAY OF TROPONIN QUANT: CPT

## 2019-04-19 PROCEDURE — 85027 COMPLETE CBC AUTOMATED: CPT

## 2019-04-19 PROCEDURE — 93005 ELECTROCARDIOGRAM TRACING: CPT

## 2019-04-19 PROCEDURE — 99285 EMERGENCY DEPT VISIT HI MDM: CPT

## 2019-04-19 PROCEDURE — 80048 BASIC METABOLIC PNL TOTAL CA: CPT

## 2019-04-19 PROCEDURE — 83880 ASSAY OF NATRIURETIC PEPTIDE: CPT

## 2019-04-19 PROCEDURE — 82962 GLUCOSE BLOOD TEST: CPT

## 2019-04-19 PROCEDURE — 6360000002 HC RX W HCPCS: Performed by: EMERGENCY MEDICINE

## 2019-04-19 PROCEDURE — 6360000004 HC RX CONTRAST MEDICATION: Performed by: RADIOLOGY

## 2019-04-19 PROCEDURE — 71045 X-RAY EXAM CHEST 1 VIEW: CPT

## 2019-04-19 PROCEDURE — 83735 ASSAY OF MAGNESIUM: CPT

## 2019-04-19 PROCEDURE — 93971 EXTREMITY STUDY: CPT

## 2019-04-19 PROCEDURE — A9579 GAD-BASE MR CONTRAST NOS,1ML: HCPCS | Performed by: PHYSICIAN ASSISTANT

## 2019-04-19 PROCEDURE — 85610 PROTHROMBIN TIME: CPT

## 2019-04-19 PROCEDURE — 70552 MRI BRAIN STEM W/DYE: CPT

## 2019-04-19 PROCEDURE — 2580000003 HC RX 258: Performed by: EMERGENCY MEDICINE

## 2019-04-19 PROCEDURE — 71275 CT ANGIOGRAPHY CHEST: CPT

## 2019-04-19 RX ORDER — POTASSIUM CHLORIDE 1.5 G/1.77G
40 POWDER, FOR SOLUTION ORAL ONCE
Status: DISCONTINUED | OUTPATIENT
Start: 2019-04-19 | End: 2019-04-19 | Stop reason: CLARIF

## 2019-04-19 RX ORDER — MECLIZINE HCL 12.5 MG/1
25 TABLET ORAL ONCE
Status: COMPLETED | OUTPATIENT
Start: 2019-04-19 | End: 2019-04-19

## 2019-04-19 RX ORDER — MECLIZINE HYDROCHLORIDE 25 MG/1
25 TABLET ORAL 3 TIMES DAILY PRN
Qty: 21 TABLET | Refills: 0 | Status: SHIPPED | OUTPATIENT
Start: 2019-04-19 | End: 2019-04-26

## 2019-04-19 RX ORDER — FUROSEMIDE 10 MG/ML
20 INJECTION INTRAMUSCULAR; INTRAVENOUS ONCE
Status: COMPLETED | OUTPATIENT
Start: 2019-04-19 | End: 2019-04-19

## 2019-04-19 RX ORDER — SODIUM CHLORIDE 9 MG/ML
1000 INJECTION, SOLUTION INTRAVENOUS CONTINUOUS
Status: DISCONTINUED | OUTPATIENT
Start: 2019-04-19 | End: 2019-04-19 | Stop reason: HOSPADM

## 2019-04-19 RX ADMIN — SODIUM CHLORIDE 1000 ML: 9 INJECTION, SOLUTION INTRAVENOUS at 12:58

## 2019-04-19 RX ADMIN — MECLIZINE 25 MG: 12.5 TABLET ORAL at 16:05

## 2019-04-19 RX ADMIN — IOPAMIDOL 80 ML: 755 INJECTION, SOLUTION INTRAVENOUS at 14:03

## 2019-04-19 RX ADMIN — FUROSEMIDE 20 MG: 10 INJECTION, SOLUTION INTRAVENOUS at 14:50

## 2019-04-19 RX ADMIN — POTASSIUM BICARBONATE 40 MEQ: 782 TABLET, EFFERVESCENT ORAL at 14:50

## 2019-04-19 RX ADMIN — GADOTERIDOL 10 ML: 279.3 INJECTION, SOLUTION INTRAVENOUS at 10:21

## 2019-04-19 NOTE — ED PROVIDER NOTES
HPI:  4/19/19,   Time: 2:20 PM         Vijay Tolentino is a 80 y.o. female presenting to the ED for Dizziness and tachycardia, beginning several minutes after getting a MRI of the brain. The complaint has been persistent, mild in severity, and worsened by changing position, movement of the head, standing. Patient stated that after the MRI she was having trouble standing and walking. She was unable to drive her car back home. She cannot leave the parking lot she came into the emergency department for evaluation. Patient also complains of shortness of breath stated that her left leg is swollen. She does have history of A. fib currently on [employer]. ROS:   Pertinent positives and negatives are stated within HPI, all other systems reviewed and are negative.  --------------------------------------------- PAST HISTORY ---------------------------------------------  Past Medical History:  has a past medical history of Bilateral carotid artery stenosis, Bruit of right carotid artery, CAD (coronary artery disease), Diabetes mellitus ([address] H/O cardiac catheterization, Hyperlipidemia, Hypertension, Hypothyroidism, and Leg swelling. Past Surgical History:  has a past surgical history that includes Thyroidectomy (11/15/2012); Diagnostic Cardiac Cath Lab Procedure (2013); Colonoscopy (2014); Coronary angioplasty (1999); Cardiac surgery (1987); Appendectomy; other surgical history; Hysterectomy (patient states she was in her 42's ); Abdomen surgery (patient states she was in her 19's ); Cholecystectomy (1989); joint replacement (Bilateral); shoulder surgery (Left, 2007); shoulder surgery (Right, 2004); and Endoscopy, colon, diagnostic (6/16/2014). Social History:  reports that she has never smoked. She has never used smokeless tobacco. She reports that she does not drink alcohol or use drugs. Family History: family history includes Cancer in her brother and mother; Heart Disease in her brother and sister.      The patients home medications have been reviewed. Allergies: Patient has no known allergies. -------------------------------------------------- RESULTS -------------------------------------------------  All laboratory and radiology results have been personally reviewed by myself   LABS:  Results for orders placed or performed during the hospital encounter of 04/19/19   CBC   Result Value Ref Range    WBC 7.4 4.5 - 11.5 E9/L    RBC 3.78 3.50 - 5.50 E12/L    Hemoglobin 11.5 11.5 - 15.5 g/dL    Hematocrit 34.3 34.0 - 48.0 %    MCV 90.7 80.0 - 99.9 fL    MCH 30.4 26.0 - 35.0 pg    MCHC 33.5 32.0 - 34.5 %    RDW 13.4 11.5 - 15.0 fL    Platelets 098 302 - 249 E9/L    MPV 9.9 7.0 - 12.0 fL   Basic Metabolic Panel   Result Value Ref Range    Sodium 141 132 - 146 mmol/L    Potassium 3.3 (L) 3.5 - 5.0 mmol/L    Chloride 100 98 - 107 mmol/L    CO2 30 (H) 22 - 29 mmol/L    Anion Gap 11 7 - 16 mmol/L    Glucose 132 (H) 74 - 99 mg/dL    BUN 17 8 - 23 mg/dL    CREATININE 0.9 0.5 - 1.0 mg/dL    GFR Non-African American 59 >=60 mL/min/1.73    GFR African American >60     Calcium 9.0 8.6 - 10.2 mg/dL   Magnesium   Result Value Ref Range    Magnesium 2.0 1.6 - 2.6 mg/dL   Troponin   Result Value Ref Range    Troponin <0.01 0.00 - 0.03 ng/mL   Brain Natriuretic Peptide   Result Value Ref Range    Pro- (H) 0 - 450 pg/mL   Protime-INR   Result Value Ref Range    Protime 15.2 (H) 9.3 - 12.4 sec    INR 1.4    APTT   Result Value Ref Range    aPTT 32.9 24.5 - 35.1 sec   POCT Glucose   Result Value Ref Range    Glucose 114 mg/dL    QC OK? yes    POCT Glucose   Result Value Ref Range    Meter Glucose 114 (H) 74 - 99 mg/dL       RADIOLOGY:  Interpreted by Radiologist.  XR CHEST PORTABLE   Final Result   1. Stable, enlarged cardiomediastinal silhouette with mild central   pulmonary vascular congestion and thoracic aortic vascular   calcifications. 2. Bibasilar atelectasis. 3. Severe left glenohumeral osteoarthritis.       CTA PULMONARY W CONTRAST   Final Result   1. No CT evidence of pulmonary embolism to the segmental level. More   distal pulmonary emboli are not excluded. 2. Moderate cardiomegaly with calcified and noncalcified plaque within   the thoracic and abdominal aorta and other major arterial flow/upper   abdomen. Additional area of vascular calcification/disease noted in   the left main, left anterior descending, left circumflex and right   coronary arteries. US DUP LOWER EXTREMITY LEFT LEMUEL   Final Result   No evidence for deep venous thrombosis within the left lower   extremity.                   ------------------------- NURSING NOTES AND VITALS REVIEWED ---------------------------   The nursing notes within the ED encounter and vital signs as below have been reviewed. BP (!) 149/79   Pulse 68   Temp 97.5 °F (36.4 °C) (Oral)   Resp 16   Ht 5' (1.524 m)   Wt 161 lb (73 kg)   SpO2 99%   BMI 31.44 kg/m²   Oxygen Saturation Interpretation: Normal      ---------------------------------------------------PHYSICAL EXAM--------------------------------------      Constitutional/General: Alert and oriented x3, well appearing, non toxic in NAD  Head: NC/AT  Eyes: PERRL, EOMI  Mouth: Oropharynx clear, handling secretions, no trismus  Neck: Supple, full ROM, no meningeal signs  Pulmonary: Lungs clear to auscultation bilaterally, no wheezes, rales, or rhonchi. Not in respiratory distress  Cardiovascular:  Regular rate and rhythm, no murmurs, gallops, or rubs. 2+ distal pulses  Abdomen: Soft, non tender, non distended,   Extremities: Moves all extremities x 4.  Warm and well perfused  Skin: warm and dry without rash  Neurologic: GCS 15,  Psych: Normal Affect      ------------------------------ ED COURSE/MEDICAL DECISION MAKING----------------------  Medications   0.9 % sodium chloride infusion (1,000 mLs Intravenous New Bag 4/19/19 1258)   iopamidol (ISOVUE-370) 76 % injection 80 mL (80 mLs Intravenous Given 4/19/19 1403)   furosemide (LASIX) injection 20 mg (20 mg Intravenous Given 19 1450)   potassium bicarb-citric acid (EFFER-K) effervescent tablet 40 mEq (40 mEq Oral Given 19 1450)   meclizine (ANTIVERT) tablet 25 mg (25 mg Oral Given 19 1605)         Medical Decision Makin yo female complains of dizziness after having MRI. Patient states it's worse when she moves around and sits up. She was given IV fluids and potassium replacement as well as Antivert. On reevaluation patient states she's feeling much better. She'll be discharged home. Orthostatics were negative. All diagnostic reviewed and not worrisome. CTA of the chest was negative. Counseling: The emergency provider has spoken with the patient and discussed todays results, in addition to providing specific details for the plan of care and counseling regarding the diagnosis and prognosis. Questions are answered at this time and they are agreeable with the plan.      --------------------------------- IMPRESSION AND DISPOSITION ---------------------------------    IMPRESSION  1.  Dizziness Stable       DISPOSITION  Disposition: Discharge to home  Patient condition is stable                Chayito Escalera DO  [phone]

## 2019-04-22 NOTE — PROCEDURES
[address]                  [address] [facility]                                VASCULAR REPORT    PATIENT NAME: Hany Packer                     :        1931  MED REC NO:   [id number]                            ROOM:  ACCOUNT NO:   [de-identified]                           ADMIT DATE: 2019  PROVIDER:     Glenna Bartlett MD    DATE OF PROCEDURE:  2019    CAROTID ULTRASOUND REPORT    INDICATION:  Bilateral carotid artery stenosis. FINDINGS:  Duplex scan of the right carotid artery revealed hard plaque  with a peak internal carotid velocity of 647, diastolic velocity of 62  cm/sec with the plaque causing 70% stenosis. Duplex scan of the left carotid artery revealed hard plaque with a peak  internal carotid velocity of 344, diastolic velocity of 33 cm/sec with  the plaque causing 50% stenosis. IMPRESSION:  Right carotid artery stenosis of 70% associated with left  carotid artery stenosis of 50%, unchanged from last year.         Loli Mora MD    D: 2019 14:05:21       T: 2019 23:48:18     [id number]  Job#: [id number]     Doc#: [id number]    CC:

## 2019-04-24 LAB
EKG ATRIAL RATE: 71 BPM
EKG P AXIS: 52 DEGREES
EKG P-R INTERVAL: 268 MS
EKG Q-T INTERVAL: 458 MS
EKG QRS DURATION: 86 MS
EKG QTC CALCULATION (BAZETT): 497 MS
EKG T AXIS: 27 DEGREES
EKG VENTRICULAR RATE: 71 BPM

## 2019-06-07 ENCOUNTER — TELEPHONE (OUTPATIENT)
Dept: ADMINISTRATIVE | Age: 84
End: 2019-06-07

## 2019-06-07 NOTE — TELEPHONE ENCOUNTER
Patient notified of Dr. Mead Parent recommendation. Patient wishes to wait and see Dr. Viridiana Falcon. F/U scheduled for 8/20/19 ta 9:20 a.m.

## 2019-06-07 NOTE — TELEPHONE ENCOUNTER
Spoke to patient. She was last seen in the office in April 2019. She does have complaints of shortness of breath when lying down. She states she does not have any shortness of breath when sitting. She does have palpitations every now and then and rare episodes of chest pain but she states it more of a pain on the top of her left breast.  In the process of scheduling for shoulder surgery and needs cardiac clearance. Please advise.

## 2019-06-07 NOTE — TELEPHONE ENCOUNTER
Dr. Wade Mathew is planning shoulder replacement surgery but is asking for clearance. Surgery not yet scheduled. Pt would like an appt to discuss other problems. Please contact pt.

## 2019-06-27 RX ORDER — ASPIRIN 81 MG/1
TABLET ORAL
Qty: 90 TABLET | Refills: 3 | Status: SHIPPED | OUTPATIENT
Start: 2019-06-27

## 2019-09-03 LAB
ALBUMIN SERPL-MCNC: NORMAL G/DL
ALP BLD-CCNC: NORMAL U/L
ALT SERPL-CCNC: NORMAL U/L
ANION GAP SERPL CALCULATED.3IONS-SCNC: NORMAL MMOL/L
AST SERPL-CCNC: NORMAL U/L
AVERAGE GLUCOSE: 131
BILIRUB SERPL-MCNC: NORMAL MG/DL
BUN BLDV-MCNC: NORMAL MG/DL
CALCIUM SERPL-MCNC: NORMAL MG/DL
CHLORIDE BLD-SCNC: NORMAL MMOL/L
CHOLESTEROL, TOTAL: NORMAL
CHOLESTEROL/HDL RATIO: NORMAL
CO2: NORMAL
CREAT SERPL-MCNC: NORMAL MG/DL
GFR CALCULATED: NORMAL
GLUCOSE BLD-MCNC: NORMAL MG/DL
HBA1C MFR BLD: 6.2 %
HDLC SERPL-MCNC: NORMAL MG/DL
LDL CHOLESTEROL CALCULATED: NORMAL
NONHDLC SERPL-MCNC: NORMAL MG/DL
POTASSIUM SERPL-SCNC: NORMAL MMOL/L
SODIUM BLD-SCNC: NORMAL MMOL/L
TOTAL PROTEIN: NORMAL
TRIGL SERPL-MCNC: NORMAL MG/DL
VLDLC SERPL CALC-MCNC: NORMAL MG/DL

## 2019-09-17 RX ORDER — METOPROLOL SUCCINATE 25 MG/1
25 TABLET, EXTENDED RELEASE ORAL DAILY
Qty: 90 TABLET | Refills: 3 | Status: SHIPPED | OUTPATIENT
Start: 2019-09-17

## 2019-10-17 DIAGNOSIS — I65.23 BILATERAL CAROTID ARTERY STENOSIS: Primary | ICD-10-CM

## 2019-10-31 ENCOUNTER — OFFICE VISIT (OUTPATIENT)
Dept: VASCULAR SURGERY | Age: 84
End: 2019-10-31
Payer: MEDICARE

## 2019-10-31 ENCOUNTER — HOSPITAL ENCOUNTER (OUTPATIENT)
Dept: CARDIOLOGY | Age: 84
Discharge: HOME | End: 2019-10-31
Payer: MEDICARE

## 2019-10-31 DIAGNOSIS — I65.23 BILATERAL CAROTID ARTERY STENOSIS: Primary | ICD-10-CM

## 2019-10-31 DIAGNOSIS — R09.89 BRUIT OF RIGHT CAROTID ARTERY: ICD-10-CM

## 2019-10-31 DIAGNOSIS — I65.23 BILATERAL CAROTID ARTERY STENOSIS: ICD-10-CM

## 2019-10-31 PROCEDURE — 99213 OFFICE O/P EST LOW 20 MIN: CPT | Performed by: SURGERY

## 2019-10-31 PROCEDURE — 4040F PNEUMOC VAC/ADMIN/RCVD: CPT | Performed by: SURGERY

## 2019-10-31 PROCEDURE — G8427 DOCREV CUR MEDS BY ELIG CLIN: HCPCS | Performed by: SURGERY

## 2019-10-31 PROCEDURE — 1090F PRES/ABSN URINE INCON ASSESS: CPT | Performed by: SURGERY

## 2019-10-31 PROCEDURE — 1036F TOBACCO NON-USER: CPT | Performed by: SURGERY

## 2019-10-31 PROCEDURE — G8484 FLU IMMUNIZE NO ADMIN: HCPCS | Performed by: SURGERY

## 2019-10-31 PROCEDURE — G8598 ASA/ANTIPLAT THER USED: HCPCS | Performed by: SURGERY

## 2019-10-31 PROCEDURE — G8417 CALC BMI ABV UP PARAM F/U: HCPCS | Performed by: SURGERY

## 2019-10-31 PROCEDURE — 1123F ACP DISCUSS/DSCN MKR DOCD: CPT | Performed by: SURGERY

## 2019-10-31 PROCEDURE — 93880 EXTRACRANIAL BILAT STUDY: CPT

## 2019-12-09 RX ORDER — FUROSEMIDE 40 MG/1
40 TABLET ORAL DAILY
Qty: 30 TABLET | Refills: 11 | Status: SHIPPED
Start: 2019-12-09 | End: 2022-03-10

## 2020-02-13 ENCOUNTER — TELEPHONE (OUTPATIENT)
Dept: VASCULAR SURGERY | Age: 85
End: 2020-02-13

## 2020-02-28 LAB
BILIRUBIN, URINE: NORMAL
BLOOD, URINE: NORMAL
CLARITY: NORMAL
COLOR: NORMAL
GLUCOSE URINE: NORMAL
KETONES, URINE: NORMAL
LEUKOCYTE ESTERASE, URINE: NORMAL
NITRITE, URINE: NORMAL
PH UA: NORMAL
PROTEIN UA: NORMAL
SPECIFIC GRAVITY, URINE: NORMAL
UROBILINOGEN, URINE: NORMAL

## 2020-05-14 ENCOUNTER — HOSPITAL ENCOUNTER (OUTPATIENT)
Dept: CARDIOLOGY | Age: 85
Discharge: HOME | End: 2020-05-14
Payer: MEDICARE

## 2020-05-14 PROCEDURE — 93880 EXTRACRANIAL BILAT STUDY: CPT

## 2020-05-19 VITALS — WEIGHT: 141 LBS | HEIGHT: 60 IN | BODY MASS INDEX: 27.68 KG/M2

## 2020-05-20 ENCOUNTER — VIRTUAL VISIT (OUTPATIENT)
Dept: VASCULAR SURGERY | Age: 85
End: 2020-05-20
Payer: MEDICARE

## 2020-05-20 PROBLEM — R31.0 GROSS HEMATURIA: Chronic | Status: RESOLVED | Noted: 2017-09-14 | Resolved: 2020-05-20

## 2020-05-20 PROBLEM — R33.8 ACUTE URINARY RETENTION: Status: RESOLVED | Noted: 2017-09-14 | Resolved: 2020-05-20

## 2020-05-20 PROCEDURE — 99442 PR PHYS/QHP TELEPHONE EVALUATION 11-20 MIN: CPT | Performed by: SURGERY

## 2020-06-17 RX ORDER — LISINOPRIL 40 MG/1
40 TABLET ORAL DAILY
Qty: 30 TABLET | Refills: 0 | Status: SHIPPED | OUTPATIENT
Start: 2020-06-17 | End: 2022-03-10

## 2020-06-17 RX ORDER — ROSUVASTATIN CALCIUM 10 MG/1
10 TABLET, COATED ORAL DAILY
Qty: 30 TABLET | Refills: 0 | Status: SHIPPED | OUTPATIENT
Start: 2020-06-17

## 2020-08-21 VITALS
SYSTOLIC BLOOD PRESSURE: 124 MMHG | HEART RATE: 64 BPM | DIASTOLIC BLOOD PRESSURE: 86 MMHG | HEIGHT: 60 IN | BODY MASS INDEX: 29.25 KG/M2 | WEIGHT: 149 LBS

## 2020-11-18 ENCOUNTER — TELEPHONE (OUTPATIENT)
Dept: VASCULAR SURGERY | Age: 85
End: 2020-11-18

## 2020-11-18 ENCOUNTER — PATIENT MESSAGE (OUTPATIENT)
Dept: VASCULAR SURGERY | Age: 85
End: 2020-11-18

## 2020-11-18 NOTE — TELEPHONE ENCOUNTER
Spoke with patient and scheduled carotid ultrasound and follow up with Dr. Tawny Chavez on 1-27-21 at 2:30 pm.  (she was scheduled for ultrasound and OV last week, did not show up as she states she was getting ready for heart cath that was done yesterday) Informed her that I faxed clearance to Dr. Fartun Espinoza for her shoulder surgery.

## 2020-11-18 NOTE — TELEPHONE ENCOUNTER
----- Message from Mai Monaco MD sent at 11/18/2020  1:12 PM EST -----  Patient called me through [facility] asking me to send my last office note to her orthopedic surgeon    Please see my note in the chart as documented below    Patient sent a message through the [facility], patient's telephone #[phone] that she is having a shoulder replacement send the last report to [phone], fax #[phone]     Patient had a carotid ultrasound done in May of this year, asymptomatic 70% stenosis on the right, 50% stenosis on the left, patient recommended follow-up appointment to see me in 6 months     Patient okay to proceed with shoulder replacement surgery from a vascular perspective, taking precautions to avoid wilmar operative hypotension    Please fax this notes, along with my last office note 6 months ago, the carotid ultrasound report    Please call her and give her an appointment to see me in the office, she is supposed to come back and see me in 6 months which should be this month, the last ultrasound was done in May she needs it done every 6 months    Thank you

## 2021-01-27 ENCOUNTER — HOSPITAL ENCOUNTER (OUTPATIENT)
Dept: CARDIOLOGY | Age: 86
Discharge: HOME | End: 2021-01-27
Payer: MEDICARE

## 2021-01-27 ENCOUNTER — OFFICE VISIT (OUTPATIENT)
Dept: VASCULAR SURGERY | Age: 86
End: 2021-01-27
Payer: MEDICARE

## 2021-01-27 VITALS — BODY MASS INDEX: 27.68 KG/M2 | WEIGHT: 141 LBS | HEIGHT: 60 IN

## 2021-01-27 DIAGNOSIS — I65.23 BILATERAL CAROTID ARTERY STENOSIS: ICD-10-CM

## 2021-01-27 DIAGNOSIS — R09.89 BRUIT OF RIGHT CAROTID ARTERY: ICD-10-CM

## 2021-01-27 DIAGNOSIS — I65.23 BILATERAL CAROTID ARTERY STENOSIS: Primary | ICD-10-CM

## 2021-01-27 PROCEDURE — 93880 EXTRACRANIAL BILAT STUDY: CPT

## 2021-01-27 PROCEDURE — G8484 FLU IMMUNIZE NO ADMIN: HCPCS | Performed by: SURGERY

## 2021-01-27 PROCEDURE — 1123F ACP DISCUSS/DSCN MKR DOCD: CPT | Performed by: SURGERY

## 2021-01-27 PROCEDURE — G8427 DOCREV CUR MEDS BY ELIG CLIN: HCPCS | Performed by: SURGERY

## 2021-01-27 PROCEDURE — 99214 OFFICE O/P EST MOD 30 MIN: CPT | Performed by: SURGERY

## 2021-01-27 PROCEDURE — 1090F PRES/ABSN URINE INCON ASSESS: CPT | Performed by: SURGERY

## 2021-01-27 PROCEDURE — G8417 CALC BMI ABV UP PARAM F/U: HCPCS | Performed by: SURGERY

## 2021-01-27 PROCEDURE — 1036F TOBACCO NON-USER: CPT | Performed by: SURGERY

## 2021-01-27 PROCEDURE — 4040F PNEUMOC VAC/ADMIN/RCVD: CPT | Performed by: SURGERY

## 2021-01-27 NOTE — PROGRESS NOTES
Chief Complaint:   Chief Complaint   Patient presents with    Circulatory Problem     bruit of rt. carotid artery         HPI: Patient came to the office, for the evaluation of carotid artery disease, bilateral right more than the left    Since my last visit, patient tells me that she had multiple medical issues, never had a shoulder surgery done, because she developed pneumonia    Subsequently she had heart problems, underwent cardiac catheterization, patient tells me that she was told by the cardiologist in [place] not to have the shoulder surgery done and was recommended medical therapy for underlying heart disease    Patient went for second opinion in [facility] and was told that her heart is stable enough to undergo the shoulder surgery    Patient now came to the office to make sure that she is stable enough to go ahead with a shoulder surgery from a vascular perspective      Patient denies any focal lateralizing neurological symptoms like loss of speech, vision or loss of function of extremity    Patient can walk a few blocks slowly, and denies any symptoms of rest pain    No Known Allergies    Current Outpatient Medications   Medication Sig Dispense Refill    lisinopril (PRINIVIL;ZESTRIL) 40 MG tablet Take 1 tablet by mouth daily 30 tablet 0    rosuvastatin (CRESTOR) 10 MG tablet Take 1 tablet by mouth daily 30 tablet 0    furosemide (LASIX) 40 MG tablet TAKE 1 TABLET BY MOUTH DAILY 30 tablet 11    metoprolol succinate (TOPROL XL) 25 MG extended release tablet TAKE 1 TABLET BY MOUTH DAILY 90 tablet 3    ASPIRIN ADULT LOW STRENGTH 81 MG EC tablet TAKE 1 TABLET BY MOUTH DAILY 90 tablet 3    metFORMIN (GLUCOPHAGE) 500 MG tablet 500 mg daily (with breakfast)   5    gabapentin (NEURONTIN) 100 MG capsule Take 100 mg by mouth daily. .  5    ELIQUIS 2.5 MG TABS tablet 2.5 mg 2 times daily   3    amiodarone (PACERONE) 100 MG tablet Take 1 tablet by mouth every other day 45 tablet 3    levothyroxine (SYNTHROID) 125 MCG tablet Take 100 mcg by mouth Daily        No current facility-administered medications for this visit. Past Medical History:   Diagnosis Date    Atrial fibrillation [facility])     Bilateral carotid artery stenosis 3/23/2017    Bruit of right carotid artery 3/23/2017    CAD (coronary artery disease) 1999    STENTS X2, FOLLOWS WITH DR. Star Gunn, Last visit 3/2014, will see in September 2014     Chronic diastolic heart failure ([address]     Diabetes mellitus ([address] 1/30/14    BORDERLINE, NO RX CURRENTLY, DIET CONTROLLED Stable checks every other day  running between  6/2014    H/O cardiac catheterization 8/2013    [facility], PATIENT STATES THAT STENTS ARE 50% BLOCKED AND IS CURRENTLY UNDER OBSERVATION WITH DR. Espinoza Orozco, states they are stable as of 3/14    Hyperlipidemia     stable 6/2014 per patient     Hypertension     PATIENT STATES BP HAS BEEN STABLE 'S  6/2014    Hypothyroidism     stable 6/2014     Leg swelling 9/20/2017    Neuropathy     diabetic peripheral neuropathy    Obesity     Osteoarthritis     Type 2 diabetes mellitus without complication ([address]     Vitamin D deficiency        Past Surgical History:   Procedure Laterality Date    ABDOMEN SURGERY  patient states she was in her 19's     abdominal fissure repair     APPENDECTOMY      16years old    2021 [facility].    CABG X 2, DR. BAER AND [address]    COLONOSCOPY  2014    CORONARY ANGIOPLASTY  1999    DR. LOPEZ., STENTS X 2    DIAGNOSTIC CARDIAC CATH LAB PROCEDURE  2013    SIGNIFICANT LEFT MAIN DISEASE: STATUS POST AORTOCORONARY BYPASS GRAFTING WITH LEFT INTERNAL MAMMARY ARTERY GRAFT TO LEFT ANTERIOR DESCENDING, AS WELL AS SAPHENOUR VEIN GRAFTS TO OBTUSE MARGINAL.     ENDOSCOPY, COLON, DIAGNOSTIC  6/16/2014    HYSTERECTOMY  patient states she was in her 42's     JOINT REPLACEMENT Bilateral     knees -1999    OTHER SURGICAL HISTORY      lysis of adhesions, patient states she was in her 19's    SHOULDER SURGERY Left 2007    rotator cuff    SHOULDER SURGERY Right 2004    replacement     THYROIDECTOMY  11/15/2012    [facility] Dr. Rufino Orta        Family History   Problem Relation Age of Onset    Cancer Mother         colon    Heart Disease Sister     Heart Failure Sister     Diabetes Sister     High Blood Pressure Sister     Cancer Brother         liver    Heart Disease Brother        Social History     Socioeconomic History    Marital status:      Spouse name: Not on file    Number of children: Not on file    Years of education: Not on file    Highest education level: Not on file   Occupational History    Not on file   Social Needs    Financial resource strain: Not on file    Food insecurity     Worry: Not on file     Inability: Not on file    Transportation needs     Medical: Not on file     Non-medical: Not on file   Tobacco Use    Smoking status: Never Smoker    Smokeless tobacco: Never Used   Substance and Sexual Activity    Alcohol use: No     Alcohol/week: 0.0 standard drinks    Drug use: No    Sexual activity: Not on file   Lifestyle    Physical activity     Days per week: Not on file     Minutes per session: Not on file    Stress: Not on file   Relationships    Social connections     Talks on phone: Not on file     Gets together: Not on file     Attends Holiness service: Not on file     Active member of club or organization: Not on file     Attends meetings of clubs or organizations: Not on file     Relationship status: Not on file    Intimate partner violence     Fear of current or ex partner: Not on file     Emotionally abused: Not on file     Physically abused: Not on file     Forced sexual activity: Not on file   Other Topics Concern    Not on file   Social History Narrative    Not on file       Review of Systems:    Eyes:  No blurring, diplopia or vision loss.   Respiratory:  No cough, pleuritic chest pain, dyspnea, or wheezing. Cardiovascular: No angina, palpitations . Coronary artery disease with history of atrial fibrillation, hypertension, hyperlipidemia  Musculoskeletal:  No arthritis or weakness. Neurologic:  No paralysis, paresis, paresthesia, seizures or headache. Endocrinology: Diabetes mellitus, hypothyroidism      Physical Exam:  General appearance:  Alert, awake, oriented x 3. No distress. Eyes:  Conjunctivae appear normal; PERRL  Neck:  No jugular venous distention, lymphadenopathy or thyromegaly. Carotid bruit noted  Lungs:  Clear to ausculation bilaterally. No rhonchi, crackles, wheezes  Heart:  Regular rate and rhythm. No rub or murmur  Abdomen:  Soft, non-tender. No masses, organomegaly. Musculoskeletal : No joint effusions, tenderness swelling    Neuro: Speech is intact. Moving all extremities. No focal motor or sensory deficits      Extremities:  Both feet are warm to touch. The color of both feet is normal.        Pulses Right  Left    Brachial 3 3    Radial    3=normal   Femoral 2 2  2=diminished   Popliteal    1=barely palpable   Dorsalis pedis    0=absent   Posterior tibial    4=aneurysmal             Other pertinent information:1. The past medical records were reviewed. Assessment:    1. Bilateral carotid artery stenosis    2.  Bruit of right carotid artery              Plan:       Discussed with patient regarding the results of the carotid ultrasound, 70% stenosis on the right, 50% was on left, unchanged from 6 months ago, and as the patient is asymptomatic, patient was recommended consider therapy with instruction to call me immediately for any focal lateralizing neurologic symptoms, explained    Patient again was informed, that she may proceed with rshoulder surgery, left or right shoulder surgeries, taking precautions to prevent perioperative hypotension and call me immediately if any neurological symptoms, explained        Patient was instructed to continue walking program and to call if any worsening of symptoms and to call if any focal lateralizing neurological symptoms like loss of speech, vision or loss of function of extremity. All the questions were answered. Indicated follow-up: Return in about 6 months (around 7/27/2021), or if symptoms worsen or fail to improve.

## 2021-01-27 NOTE — PROGRESS NOTES
[facility] - [facility]    This is a pre read worksheet - prior to official physician interpretation    Denversaad Cornell  4/20/1931  Date of study: 1/27/21  [id number]    Indication for study:  Carotid artery stenosis  Study : Bilateral Carotid Artery Duplex Examination    Duplex examination of the RIGHT carotid artery system identifies atherosclerotic plaque. The peak systolic velocity in internal carotid artery was 359 centimeters / second. The maximum end diastolic velocity was 73 centimeters / second. The ICA/CCA ratio is 3.0. The right vertebral artery has antegrade flow. Duplex examination of the LEFT carotid artery system identifies atherosclerotic plaque. The peak systolic velocity in internal carotid artery was 123 centimeters / second. The maximum end diastolic velocity was 29 centimeters / second. The ICA/CCA ratio is 1.1. The left vertebral artery has antegrade flow.     RIGHT 70%    psv  LEFT 50%  Slightly limited d/t hard, dense plaques in proximal ICAS        LAST STUDY  5/14/2020  Rt 70  Lt 50

## 2021-02-03 NOTE — PROCEDURES
[address]                  [address] [facility],  [facility][1]                                VASCULAR REPORT    PATIENT NAME: Santa Sahu                     :        1931  MED REC NO:   [id number]                            ROOM:  ACCOUNT NO:   [de-identified]                           ADMIT DATE: 2021  PROVIDER:     Mira Cervantes MD    DATE OF PROCEDURE:  2021    CAROTID ULTRASOUND REPORT    INDICATION:  Bilateral carotid stenosis. FINDINGS:  Duplex scanning of the right carotid artery revealed the  patient has dense plaque with a peak internal carotid velocity of 578,  diastolic velocity of 75 cm/sec with plaque causing 70% stenosis. Duplex scanning of the left carotid artery revealed dense plaque with a  peak internal carotid velocity of 136, diastolic 30 cm/sec with plaque  causing 50% stenosis. IMPRESSION:  70% stenosis of the right carotid artery associated with  50% stenosis of left carotid artery, unchanged from last evaluation.         Casper Hua MD    D: 2021 10:47:55       T: 2021 10:55:18     [email or web address]  Job#: [id number]     Doc#: [id number]

## 2021-03-04 ENCOUNTER — TELEPHONE (OUTPATIENT)
Dept: VASCULAR SURGERY | Age: 86
End: 2021-03-04

## 2021-03-04 NOTE — TELEPHONE ENCOUNTER
Faxed office note with addendum stating patient may proceed with shoulder surgery from the vascular point of view.

## 2021-08-18 DIAGNOSIS — I65.23 BILATERAL CAROTID ARTERY STENOSIS: Primary | ICD-10-CM

## 2021-08-19 ENCOUNTER — TELEPHONE (OUTPATIENT)
Dept: VASCULAR SURGERY | Age: 86
End: 2021-08-19

## 2021-08-19 NOTE — TELEPHONE ENCOUNTER
Notified patient of appointment for US at [facility] on 9-3-21 at 11:30 am, arrive at 11:00 to register, bring insurance card.   Office visit to see Dr. King Mcbride is 9-9-21 at 2:15 pm.

## 2021-09-03 ENCOUNTER — HOSPITAL ENCOUNTER (OUTPATIENT)
Dept: ULTRASOUND IMAGING | Age: 86
Discharge: HOME | End: 2021-09-05
Payer: MEDICARE

## 2021-09-03 DIAGNOSIS — I65.23 BILATERAL CAROTID ARTERY STENOSIS: ICD-10-CM

## 2021-09-03 PROCEDURE — 93880 EXTRACRANIAL BILAT STUDY: CPT

## 2021-09-08 ENCOUNTER — TELEPHONE (OUTPATIENT)
Dept: VASCULAR SURGERY | Age: 86
End: 2021-09-08

## 2021-09-08 NOTE — TELEPHONE ENCOUNTER
Patient had carotid ultrasound on 9-3-21, [facility] called today regarding results (70-99% R). Patient has appointment on 9-9-21.

## 2021-09-08 NOTE — PROGRESS NOTES
1.  The carotid ultrasound that was done was personally reviewed by me, compared with last ultrasound, based upon the velocities and atherosclerotic plaque, in my opinion, patient has approximately 70 to 75% right carotid stenosis and mild disease on the left side

## 2021-09-09 ENCOUNTER — OFFICE VISIT (OUTPATIENT)
Dept: VASCULAR SURGERY | Age: 86
End: 2021-09-09
Payer: MEDICARE

## 2021-09-09 VITALS — WEIGHT: 145 LBS | HEIGHT: 60 IN | BODY MASS INDEX: 28.47 KG/M2

## 2021-09-09 DIAGNOSIS — R09.89 BRUIT OF RIGHT CAROTID ARTERY: ICD-10-CM

## 2021-09-09 DIAGNOSIS — I65.23 BILATERAL CAROTID ARTERY STENOSIS: Primary | ICD-10-CM

## 2021-09-09 PROCEDURE — 1123F ACP DISCUSS/DSCN MKR DOCD: CPT | Performed by: SURGERY

## 2021-09-09 PROCEDURE — 4040F PNEUMOC VAC/ADMIN/RCVD: CPT | Performed by: SURGERY

## 2021-09-09 PROCEDURE — G8417 CALC BMI ABV UP PARAM F/U: HCPCS | Performed by: SURGERY

## 2021-09-09 PROCEDURE — 1090F PRES/ABSN URINE INCON ASSESS: CPT | Performed by: SURGERY

## 2021-09-09 PROCEDURE — 99213 OFFICE O/P EST LOW 20 MIN: CPT | Performed by: SURGERY

## 2021-09-09 PROCEDURE — G8427 DOCREV CUR MEDS BY ELIG CLIN: HCPCS | Performed by: SURGERY

## 2021-09-09 PROCEDURE — 1036F TOBACCO NON-USER: CPT | Performed by: SURGERY

## 2021-09-09 NOTE — PROGRESS NOTES
Chief Complaint:   Chief Complaint   Patient presents with    Circulatory Problem     bilateral carotid artery stenosis         HPI: Patient came to the office, for the evaluation of carotid artery disease, overall doing well and trying to stay active      Patient denies any focal lateralizing neurological symptoms like loss of speech, vision or loss of function of extremity    Patient can walk a few blocks , and denies any symptoms of rest pain    No Known Allergies    Current Outpatient Medications   Medication Sig Dispense Refill    lisinopril (PRINIVIL;ZESTRIL) 40 MG tablet Take 1 tablet by mouth daily 30 tablet 0    rosuvastatin (CRESTOR) 10 MG tablet Take 1 tablet by mouth daily 30 tablet 0    furosemide (LASIX) 40 MG tablet TAKE 1 TABLET BY MOUTH DAILY 30 tablet 11    metoprolol succinate (TOPROL XL) 25 MG extended release tablet TAKE 1 TABLET BY MOUTH DAILY 90 tablet 3    ASPIRIN ADULT LOW STRENGTH 81 MG EC tablet TAKE 1 TABLET BY MOUTH DAILY 90 tablet 3    metFORMIN (GLUCOPHAGE) 500 MG tablet 500 mg daily (with breakfast)   5    gabapentin (NEURONTIN) 100 MG capsule Take 100 mg by mouth daily. .  5    ELIQUIS 2.5 MG TABS tablet 2.5 mg 2 times daily   3    amiodarone (PACERONE) 100 MG tablet Take 1 tablet by mouth every other day 45 tablet 3    levothyroxine (SYNTHROID) 125 MCG tablet Take 100 mcg by mouth Daily        No current facility-administered medications for this visit. Past Medical History:   Diagnosis Date    Atrial fibrillation [facility])     Bilateral carotid artery stenosis 3/23/2017    Bruit of right carotid artery 3/23/2017    CAD (coronary artery disease) 1999    STENTS X2, FOLLOWS WITH DR. Lisa Galvan, Last visit 3/2014, will see in September 2014     Chronic diastolic heart failure ([address]     Diabetes mellitus ([address] 1/30/14    BORDERLINE, NO RX CURRENTLY, DIET CONTROLLED Stable checks every other day  running between  6/2014    H/O cardiac catheterization 8/2013    [facility], PATIENT STATES THAT STENTS ARE 50% BLOCKED AND IS CURRENTLY UNDER OBSERVATION WITH DR. Maldonado Rock, states they are stable as of 3/14    Hyperlipidemia     stable 6/2014 per patient     Hypertension     PATIENT STATES BP HAS BEEN STABLE 'S  6/2014    Hypothyroidism     stable 6/2014     Leg swelling 9/20/2017    Neuropathy     diabetic peripheral neuropathy    Obesity     Osteoarthritis     Type 2 diabetes mellitus without complication ([address]     Vitamin D deficiency        Past Surgical History:   Procedure Laterality Date    ABDOMEN SURGERY  patient states she was in her 19's     abdominal fissure repair     APPENDECTOMY      16years old    [address]    CABG X 2, DR. BAER AND [address]    COLONOSCOPY  2014    CORONARY ANGIOPLASTY  1999    DR. LOPEZ., STENTS X 2    DIAGNOSTIC CARDIAC CATH LAB PROCEDURE  2013    SIGNIFICANT LEFT MAIN DISEASE: STATUS POST AORTOCORONARY BYPASS GRAFTING WITH LEFT INTERNAL MAMMARY ARTERY GRAFT TO LEFT ANTERIOR DESCENDING, AS WELL AS SAPHENOUR VEIN GRAFTS TO OBTUSE MARGINAL.  ENDOSCOPY, COLON, DIAGNOSTIC  6/16/2014    HYSTERECTOMY  patient states she was in her 42's     JOINT REPLACEMENT Bilateral     knees -1999    OTHER SURGICAL HISTORY      lysis of adhesions, patient states she was in her 19's    SHOULDER SURGERY Left 2007    rotator cuff    SHOULDER SURGERY Right 2004    replacement     THYROIDECTOMY  11/15/2012    [place] Kris Power        Family History   Problem Relation Age of Onset    Cancer Mother         colon    Heart Disease Sister     Heart Failure Sister     Diabetes Sister     High Blood Pressure Sister     Cancer Brother         liver    Heart Disease Brother        Social History     Socioeconomic History    Marital status:       Spouse name: Not on file    Number of children: Not on file    Years of education: Not on file    Highest education level: Not on file Occupational History    Not on file   Tobacco Use    Smoking status: Never Smoker    Smokeless tobacco: Never Used   Vaping Use    Vaping Use: Never used   Substance and Sexual Activity    Alcohol use: No     Alcohol/week: 0.0 standard drinks    Drug use: No    Sexual activity: Not on file   Other Topics Concern    Not on file   Social History Narrative    Not on file     Social Determinants of Health     Financial Resource Strain:     Difficulty of Paying Living Expenses:    Food Insecurity:     Worried About Running Out of Food in the Last Year:     Ran Out of Food in the Last Year:    Transportation Needs:     Lack of Transportation (Medical):  Lack of Transportation (Non-Medical):    Physical Activity:     Days of Exercise per Week:     Minutes of Exercise per Session:    Stress:     Feeling of Stress :    Social Connections:     Frequency of Communication with Friends and Family:     Frequency of Social Gatherings with Friends and Family:     Attends [employer] Services:     Active Member of Clubs or Organizations:     Attends Club or Organization Meetings:     Marital Status:    Intimate Partner Violence:     Fear of Current or Ex-Partner:     Emotionally Abused:     Physically Abused:     Sexually Abused:        Review of Systems:    Eyes:  No blurring, diplopia or vision loss. Respiratory:  No cough, pleuritic chest pain, dyspnea, or wheezing. Cardiovascular: No angina, palpitations . Hypertension, hyperlipidemia  Musculoskeletal:  No arthritis or weakness. Neurologic:  No paralysis, paresis, paresthesia, seizures or headache. Endocrinology: Diabetes mellitus, hypothyroidism          Physical Exam:  General appearance:  Alert, awake, oriented x 3. No distress. Eyes:  Conjunctivae appear normal; PERRL  Neck:  No jugular venous distention, lymphadenopathy or thyromegaly. Carotid bruit noted  Lungs:  Clear to ausculation bilaterally.   No rhonchi, crackles, wheezes  Heart: Regular rate and rhythm. No rub or murmur  Abdomen:  Soft, non-tender. No masses, organomegaly. Musculoskeletal : No joint effusions, tenderness swelling    Neuro: Speech is intact. Moving all extremities. No focal motor or sensory deficits      Extremities:  Both feet are warm to touch. The color of both feet is normal.        Pulses Right  Left    Brachial 3 3    Radial    3=normal   Femoral 2 2  2=diminished   Popliteal    1=barely palpable   Dorsalis pedis    0=absent   Posterior tibial    4=aneurysmal             Other pertinent information:1. The past medical records were reviewed. 1.  The carotid ultrasound that was done was personally reviewed by me, compared with last ultrasound, based upon the velocities and atherosclerotic plaque, in my opinion, patient has approximately 70 to 75% right carotid stenosis and mild disease on the left side    Assessment:    1. Bilateral carotid artery stenosis    2. Bruit of right carotid artery              Plan:       Discussed the patient, options, risks benefits were explained, patient was informed that have personally reviewed the carotid ultrasound, based upon my personal interpretation of atherosclerotic plaque on the velocities, approximately 70% plus stenosis on the right side, as the patient is asymptomatic, patient is reassured, was instructed to call me immediately if any focal lateralizing neurological symptoms, explained            Patient was instructed to continue walking program and to call if any worsening of symptoms and to call if any focal lateralizing neurological symptoms like loss of speech, vision or loss of function of extremity. All the questions were answered. Indicated follow-up: Return in about 6 months (around 3/9/2022), or if symptoms worsen or fail to improve.

## 2022-03-03 ENCOUNTER — TELEPHONE (OUTPATIENT)
Dept: VASCULAR SURGERY | Age: 87
End: 2022-03-03

## 2022-03-03 DIAGNOSIS — I65.23 BILATERAL CAROTID ARTERY STENOSIS: Primary | ICD-10-CM

## 2022-03-03 NOTE — TELEPHONE ENCOUNTER
Left message to call to schedule her carotid testing prior to appt on 3- with Dr Oneida Arroyo as lab is closed.

## 2022-03-09 ENCOUNTER — TELEPHONE (OUTPATIENT)
Dept: VASCULAR SURGERY | Age: 87
End: 2022-03-09

## 2022-03-10 ENCOUNTER — HOSPITAL ENCOUNTER (OUTPATIENT)
Dept: CARDIOLOGY | Age: 87
Discharge: HOME | End: 2022-03-10
Payer: MEDICARE

## 2022-03-10 ENCOUNTER — OFFICE VISIT (OUTPATIENT)
Dept: VASCULAR SURGERY | Age: 87
End: 2022-03-10
Payer: MEDICARE

## 2022-03-10 DIAGNOSIS — I65.23 BILATERAL CAROTID ARTERY STENOSIS: ICD-10-CM

## 2022-03-10 DIAGNOSIS — R09.89 BRUIT OF RIGHT CAROTID ARTERY: ICD-10-CM

## 2022-03-10 DIAGNOSIS — I65.23 BILATERAL CAROTID ARTERY STENOSIS: Primary | ICD-10-CM

## 2022-03-10 PROCEDURE — 99213 OFFICE O/P EST LOW 20 MIN: CPT | Performed by: SURGERY

## 2022-03-10 PROCEDURE — 93880 EXTRACRANIAL BILAT STUDY: CPT

## 2022-03-10 RX ORDER — BUMETANIDE 1 MG/1
1 TABLET ORAL 2 TIMES DAILY
COMMUNITY
Start: 2022-02-19

## 2022-03-10 RX ORDER — FERROUS SULFATE 325(65) MG
325 TABLET ORAL
COMMUNITY

## 2022-03-10 NOTE — PROGRESS NOTES
Chief Complaint:   Chief Complaint   Patient presents with    Circulatory Problem     Follow up carotid stenosis. HPI: Patient came to the office, with her family, for the evaluation of carotid artery disease, overall doing well and staying active      Patient denies any focal lateralizing neurological symptoms like loss of speech, vision or loss of function of extremity    Patient can walk 1-2 blocks slowly, and denies any symptoms of rest pain    No Known Allergies    Current Outpatient Medications   Medication Sig Dispense Refill    bumetanide (BUMEX) 1 MG tablet Take 1 mg by mouth 2 times daily      ferrous sulfate (IRON 325) 325 (65 Fe) MG tablet Take 325 mg by mouth daily (with breakfast)      rosuvastatin (CRESTOR) 10 MG tablet Take 1 tablet by mouth daily 30 tablet 0    metoprolol succinate (TOPROL XL) 25 MG extended release tablet TAKE 1 TABLET BY MOUTH DAILY 90 tablet 3    ASPIRIN ADULT LOW STRENGTH 81 MG EC tablet TAKE 1 TABLET BY MOUTH DAILY (Patient taking differently: Twice a Week ) 90 tablet 3    ELIQUIS 2.5 MG TABS tablet 2.5 mg 2 times daily   3    levothyroxine (SYNTHROID) 125 MCG tablet Take 100 mcg by mouth Daily        No current facility-administered medications for this visit. Past Medical History:   Diagnosis Date    Atrial fibrillation [facility])     Bilateral carotid artery stenosis 3/23/2017    Bruit of right carotid artery 3/23/2017    CAD (coronary artery disease) 1999    STENTS X2, FOLLOWS WITH DR. Tiffanie Pagan, Last visit 3/2014, will see in September 2014     Chronic diastolic heart failure ([address]     Diabetes mellitus ([facility] 1/30/14    BORDERLINE, NO RX CURRENTLY, DIET CONTROLLED Stable checks every other day  running between  6/2014    H/O cardiac catheterization 8/2013    [facility], PATIENT STATES THAT STENTS ARE 50% BLOCKED AND IS CURRENTLY UNDER OBSERVATION WITH DR. Gladis Santoyo, states they are stable as of 3/14    Hyperlipidemia stable 6/2014 per patient     Hypertension     PATIENT STATES BP HAS BEEN STABLE 'S  6/2014    Hypothyroidism     stable 6/2014     Leg swelling 9/20/2017    Neuropathy     diabetic peripheral neuropathy    Obesity     Osteoarthritis     Type 2 diabetes mellitus without complication (HCC)     Vitamin D deficiency        Past Surgical History:   Procedure Laterality Date    ABDOMEN SURGERY  patient states she was in her 19's     abdominal fissure repair     APPENDECTOMY      16years old    [facility].    CABG X 2, DR. BAER AND [address]    COLONOSCOPY  2014    CORONARY ANGIOPLASTY  1999    DR. LOPEZ., STENTS X 2    DIAGNOSTIC CARDIAC CATH LAB PROCEDURE  2013    SIGNIFICANT LEFT MAIN DISEASE: STATUS POST AORTOCORONARY BYPASS GRAFTING WITH LEFT INTERNAL MAMMARY ARTERY GRAFT TO LEFT ANTERIOR DESCENDING, AS WELL AS SAPHENOUR VEIN GRAFTS TO OBTUSE MARGINAL.  ENDOSCOPY, COLON, DIAGNOSTIC  6/16/2014    HYSTERECTOMY  patient states she was in her 42's     JOINT REPLACEMENT Bilateral     knees -1999    OTHER SURGICAL HISTORY      lysis of adhesions, patient states she was in her 19's    SHOULDER SURGERY Left 2007    rotator cuff    SHOULDER SURGERY Right 2004    replacement     THYROIDECTOMY  11/15/2012    [place] Dr. Dona Jose        Family History   Problem Relation Age of Onset    Cancer Mother         colon    Heart Disease Sister     Heart Failure Sister     Diabetes Sister     High Blood Pressure Sister     Cancer Brother         liver    Heart Disease Brother        Social History     Socioeconomic History    Marital status:       Spouse name: Not on file    Number of children: Not on file    Years of education: Not on file    Highest education level: Not on file   Occupational History    Not on file   Tobacco Use    Smoking status: Never Smoker    Smokeless tobacco: Never Used   Vaping Use    Vaping Use: Never used   Substance and Sexual Activity    Alcohol use: No     Alcohol/week: 0.0 standard drinks    Drug use: No    Sexual activity: Not on file   Other Topics Concern    Not on file   Social History Narrative    Not on file     Social Determinants of Health     Financial Resource Strain:     Difficulty of Paying Living Expenses: Not on file   Food Insecurity:     Worried About Running Out of Food in the Last Year: Not on file    Venus of Food in the Last Year: Not on file   Transportation Needs:     Lack of Transportation (Medical): Not on file    Lack of Transportation (Non-Medical): Not on file   Physical Activity:     Days of Exercise per Week: Not on file    Minutes of Exercise per Session: Not on file   Stress:     Feeling of Stress : Not on file   Social Connections:     Frequency of Communication with Friends and Family: Not on file    Frequency of Social Gatherings with Friends and Family: Not on file    Attends [employer] Services: Not on file    Active Member of [address] [place] [address] or Organizations: Not on file    Attends Club or Organization Meetings: Not on file    Marital Status: Not on file   Intimate Partner Violence:     Fear of Current or Ex-Partner: Not on file    Emotionally Abused: Not on file    Physically Abused: Not on file    Sexually Abused: Not on file   Housing Stability:     Unable to Pay for Housing in the Last Year: Not on file    Number of [place] in the Last Year: Not on file    Unstable Housing in the Last Year: Not on file       Review of Systems:    Eyes:  No blurring, diplopia or vision loss. Respiratory:  No cough, pleuritic chest pain, dyspnea, or wheezing. Cardiovascular: No angina, palpitations . Coronary artery disease, hypertension, hyperlipidemia  Musculoskeletal:  No arthritis or weakness. Neurologic:  No paralysis, paresis, paresthesia, seizures or headache. Endocrinology: Hypothyroidism  -        Physical Exam:  General appearance:  Alert, awake, oriented x 3.   No distress. Eyes:  Conjunctivae appear normal; PERRL  Neck:  No jugular venous distention, lymphadenopathy or thyromegaly. Carotid bruit noted  Lungs:  Clear to ausculation bilaterally. No rhonchi, crackles, wheezes  Heart:  Regular rate and rhythm. No rub or murmur  Abdomen:  Soft, non-tender. No masses, organomegaly. Musculoskeletal : No joint effusions, tenderness swelling    Neuro: Speech is intact. Moving all extremities. No focal motor or sensory deficits      Extremities:  Both feet are warm to touch. The color of both feet is normal.        Pulses Right  Left    Brachial 3 3    Radial    3=normal   Femoral 2 2  2=diminished   Popliteal    1=barely palpable   Dorsalis pedis    0=absent   Posterior tibial    4=aneurysmal             Other pertinent information:1. The past medical records were reviewed. Assessment:    1. Bilateral carotid artery stenosis    2. Bruit of right carotid artery              Plan:     Discussed the patient and the family, reviewed the carotid ultrasound that was done today, approximately 70% stenosis likely 50% stenosis left, no change, and as the patient is asymptomatic, patient is reassured and was instead call me anytime she has neurological symptoms, clearly explained            Patient was instructed to continue walking program and to call if any worsening of symptoms and to call if any focal lateralizing neurological symptoms like loss of speech, vision or loss of function of extremity. All the questions were answered. Indicated follow-up: Return in about 6 months (around 9/10/2022), or if symptoms worsen or fail to improve.

## 2022-03-13 NOTE — PROCEDURES
[address]                  [address] [facility],  [facility]                                VASCULAR REPORT    PATIENT NAME: Cornelius Valenzuela                     :        1931  MED REC NO:   [id number]                            ROOM:  ACCOUNT NO:   [de-identified]                           ADMIT DATE: 03/10/2022  PROVIDER:     Neva Vu MD    DATE OF PROCEDURE:  03/10/2022    CAROTID ULTRASOUND    INDICATION:  Bilateral carotid stenosis. FINDINGS:  Duplex scanning of the right carotid revealed the patient has  atherosclerotic plaque, with peak internal carotid velocity of 405,  diastolic velocity of 55 cm/sec with a plaque causing 70% stenosis. On the left side, moderate plaque with a peak internal carotid velocity  of 498, diastolic velocity of 30 cm/sec with plaque causing 50%  stenosis. IMPRESSION:  70% stenosis of the right carotid associated with 50% of  left carotid, unchanged from last study.         Chalino Mann MD    D: 2022 9:38:10       T: 2022 9:40:38     [email or web address]  Job#: [id number]     Doc#: [id number]    CC:

## 2022-09-02 DIAGNOSIS — I65.23 BILATERAL CAROTID ARTERY STENOSIS: Primary | ICD-10-CM

## 2022-09-14 ENCOUNTER — TELEPHONE (OUTPATIENT)
Dept: VASCULAR SURGERY | Age: 87
End: 2022-09-14

## 2022-09-14 NOTE — TELEPHONE ENCOUNTER
Daughter Antoni Singletary called to cancel her ultrasound and Dr visit for 9- with Dr Darron Mendoza, as it is not needed. Does not want to reschedule.